# Patient Record
Sex: FEMALE | Race: WHITE | NOT HISPANIC OR LATINO | Employment: FULL TIME | URBAN - METROPOLITAN AREA
[De-identification: names, ages, dates, MRNs, and addresses within clinical notes are randomized per-mention and may not be internally consistent; named-entity substitution may affect disease eponyms.]

---

## 2023-10-09 PROBLEM — S39.012A LUMBAR STRAIN, INITIAL ENCOUNTER: Status: ACTIVE | Noted: 2023-10-09

## 2023-10-09 PROBLEM — M54.50 ACUTE LEFT-SIDED LOW BACK PAIN WITHOUT SCIATICA: Status: ACTIVE | Noted: 2023-10-09

## 2023-10-09 PROBLEM — S59.902A INJURY OF LEFT ELBOW: Status: ACTIVE | Noted: 2023-10-09

## 2023-10-09 RX ORDER — CYCLOBENZAPRINE HCL 10 MG
TABLET ORAL
COMMUNITY
Start: 2019-03-19 | End: 2023-12-06 | Stop reason: ALTCHOICE

## 2023-10-10 ENCOUNTER — OFFICE VISIT (OUTPATIENT)
Dept: OTOLARYNGOLOGY | Facility: CLINIC | Age: 28
End: 2023-10-10
Payer: COMMERCIAL

## 2023-10-10 VITALS — WEIGHT: 184.9 LBS | BODY MASS INDEX: 29.72 KG/M2 | TEMPERATURE: 97.9 F | HEIGHT: 66 IN

## 2023-10-10 DIAGNOSIS — J34.89 NASAL CAVITY MASS: Primary | ICD-10-CM

## 2023-10-10 DIAGNOSIS — J34.89 NASAL OBSTRUCTION: ICD-10-CM

## 2023-10-10 DIAGNOSIS — J32.8 OTHER CHRONIC SINUSITIS: ICD-10-CM

## 2023-10-10 DIAGNOSIS — R04.0 EPISTAXIS: ICD-10-CM

## 2023-10-10 PROCEDURE — 31231 NASAL ENDOSCOPY DX: CPT | Performed by: NURSE PRACTITIONER

## 2023-10-10 PROCEDURE — 99204 OFFICE O/P NEW MOD 45 MIN: CPT | Performed by: NURSE PRACTITIONER

## 2023-10-10 PROCEDURE — 1036F TOBACCO NON-USER: CPT | Performed by: NURSE PRACTITIONER

## 2023-10-10 RX ORDER — FLUTICASONE PROPIONATE 50 MCG
SPRAY, SUSPENSION (ML) NASAL
Qty: 16 G | Refills: 3 | Status: SHIPPED | OUTPATIENT
Start: 2023-10-10 | End: 2023-11-02

## 2023-10-10 RX ORDER — DOXYCYCLINE 100 MG/1
TABLET ORAL
Qty: 20 TABLET | Refills: 0 | Status: SHIPPED | OUTPATIENT
Start: 2023-10-10 | End: 2023-12-06 | Stop reason: ALTCHOICE

## 2023-10-10 RX ORDER — PREDNISONE 10 MG/1
TABLET ORAL
Qty: 19 TABLET | Refills: 0 | Status: SHIPPED | OUTPATIENT
Start: 2023-10-10 | End: 2023-12-06 | Stop reason: ALTCHOICE

## 2023-10-10 ASSESSMENT — PATIENT HEALTH QUESTIONNAIRE - PHQ9
1. LITTLE INTEREST OR PLEASURE IN DOING THINGS: NOT AT ALL
2. FEELING DOWN, DEPRESSED OR HOPELESS: NOT AT ALL
SUM OF ALL RESPONSES TO PHQ9 QUESTIONS 1 AND 2: 0

## 2023-10-10 NOTE — PROGRESS NOTES
Subjective   Patient ID: Akosua Bo is a 28 y.o. female who presents for a sinus infection.  HPI    Akosua Bo is a 28 y.o. old female   presenting with complaints of sinus and nose problems that began about 1 week ago.   The patient describes the sinus/nose problems as sudden onset of obstruction in the left nostril. She notes that about 3 days ago, she did have a large amount of bloody mucous dislodge from her nose and has experienced benefit since then. Her facial pressure has resolved since then. Symptoms initially began in August when she thought she had a sinus infection, with colored drainage and nasal obstruction, which improved following 2 courses of Augmentin. Patient denies facial pressure, rhinorrhea, facial pain, periorbital edema, nasal obstruction, throat clearing, hoarseness, loss of taste, and loss of smell. The patient denies epistaxis. Prior history of epistaxis in high school that resolved with cautery on the right. The patient has taken Augmentin medications to alleviate the problem. The medication Augmentin has helped. The patient has not tried nasal saline irrigations. Does not have a history of allergic rhinitis or allergies. They deny a history of aspirin sensitivity. They report 0 sinus infections per year requiring antibiotic treatment. Most recent antibiotic usage includes: Augmentin x 7 days (August), Augmentin x 5 days (in Sept).     History of prior nasal/sinus surgery or procedure: Denies    Review of Systems  Review of systems is negative for constitutional, ophthalmological, cardiac, pulmonary, renal, gastrointestinal, musculoskeletal, mental health, endocrine, or neurologic disorders (except as listed in the HPI, PMH, and Problem List).     Objective   Physical Exam  CONSTITUTIONAL: Vital signs reviewed. Patient appears well developed and well nourished.   GENERAL: this is a healthy appearing female who appears stated age. The patient is alert and appropriately  verbally conversant without hoarseness. This patient is in no apparent distress.   FACE: The face was inspected and no cutaneous masses or lesions were visualized. There was no erythema or edema noted. Facial movement was symmetric. No skin lesions were detected. There was no sinus tenderness elicited. TMJ crepitus present.   EYES: Extra-ocular muscle function was intact. No nystagmus was observed. Pupils were equal.   CRANIAL NERVES: Cranial nerves II, III, IV, and VI were noted to be intact via extra-ocular muscle movement testing. Cranial nerve VII noted to be intact and symmetric by facial movement. Cranial nerves IX and X noted to be intact by gag reflex and palatal movement. Cranial nerve XII noted to be intact by active and symmetric tongue movement.   NOSE: Examination of the nose revealed the nasal dorsum to be midline. Intranasal exam reveals the septum is deviated right. The inferior turbinates were hypertrophic. No masses, polyps, mucopus, or other lesion on anterior rhinoscopy. See below procedure note as applicable for further exam.  ORAL CAVITY: Examination of the oral cavity revealed no mass lesions nor infection. The palate was noted to be intact. The tongue exhibited normal mobility. Mucosa was moist without lesion. The lips were free of lesion. Gums were free of inflammation. Dentition: normal without obvious infection or inflammation  OROPHARYNX: The oral pharynx was free of mass lesion or mucosal abnormality. The palate was noted to be without lesion. The uvula was normal appearing. The tonsils were Normal.  EARS: Examination of the ears revealed that the auricles were normally formed with no lesions. The external auditory canals were normal. The tympanic membranes were intact.  There is no inflammation visualized.   NECK: Visualization and palpation of the neck revealed no mass lesions. No skin lesions or inflammatory processes were detected. The cervical musculature was normal to palpation.    CERVICAL LYMPHATICS: There were no palpable lymph nodes in the posterior triangle, submandibular triangle, jugulodigastric region, or central neck.  RESPIRATORY: Normal inspiration and expiration and chest wall expansion, no use of accessory muscles to breathe, no stridor.  NEUROLOGICAL: Patient is ambulatory without assist. Mentation is clear. Answering questions appropriately.     Nasal / sinus endoscopy    Date/Time: 10/10/2023 2:41 PM    Performed by: IAM Wilcox  Authorized by: IAM Wilcox    Consent:     Consent obtained:  Verbal    Consent given by:  Patient    Risks discussed:  Bleeding, infection and pain    Alternatives discussed:  No treatment, observation and delayed treatment  Procedure details:     Indications: assessment of airway and sino-nasal symptoms      Medication:  Afrin and Lai-Synephrine 2%    Instrument: flexible fiberoptic nasal endoscope      Scope location: bilateral nare    Post-procedure details:     Patient tolerance of procedure:  Tolerated well, no immediate complications  Comments:      Findings: After topical decongestion with decongestant and anesthetic spray, nasal endoscopy was performed using an endoscope. The septum was deviated right. The inferior turbinates were hypertrophic.  The middle turbinates appeared healthy, the middle meatus is free of purulence, masses, lesions or polyps on the right. On the left, there is a large crusted mass extending from the middle meatus and following the inferior turbinate posteriorly. The superior meatus and sphenoethmoid recess are clear bilaterally. The nasal passageway is patent. The nasopharynx was clear. There were no complications and the patient tolerated the procedure well.      Assessment/Plan     Akosua Bo is a 28 y.o. year old female with symptoms of nasal obstruction, nasal drainage, epistaxis and clinical findings consistent with chronic rhinosinusitis with nasal polyposis versus  left sided sinonasal mass with a large mass extending from the left middle meatus posteriorly that is crusted anteriorly.      PLAN:  1) Nasal Endoscopy today.Findings: right dev. Large mass from the left middle meatus, extending toward the nasopharynx.  Reassurance and counseling provided to patient and his condition was extensively discussed including as noted below:  2) We will start the patient on antibiotics and prednisone to complete maximal medical therapy. Rx for doxycycline x 10 days. Advised to take with food and discontinue for adverse effects.  3) Start prednisone taper. Prescription placed. Discussed potential side effects of oral steroid use were discussed at length with the patient. These risks include but are not limited to: difficulty sleeping/insomnia, increased appetite, fluid retention, mood swings, weight gain, change in blood pressure, high blood glucose, possible adrenal suppression, osteoporosis, avascular necrosis of the hip, menstrual irregularities (if applicable), and cataracts. The patient understands these risks and is willing to proceed with oral steroid therapy.   4) Patient was instructed to start steroid nasal spray.  5) A CT scan of the sinuses without contrast will be obtained post treatment.  6) The patient will follow up in 1 month after the CT scan has been completed.

## 2023-10-10 NOTE — PATIENT INSTRUCTIONS
Today you were evaluated by Caitlin Cisneros CNP.    Please follow-up in 4  weeks or sooner if needed. If you have any questions or concerns, please contact my office at [phone].     You will be started on Doxycycline, twice daily, for 10 days.    As discussed, use of doxycycline can cause a skin sensitivity reaction with the sun. Avoid sun exposure while taking this medication. Also, do not take this medication with dairy. Take a daily probiotic.    Start prednisone taper. Prescription placed. Discussed potential side effects of oral steroid use were discussed at length with the patient. These risks include but are not limited to: difficulty sleeping/insomnia, increased appetite, fluid retention, mood swings, weight gain, change in blood pressure, high blood glucose, possible adrenal suppression, osteoporosis, avascular necrosis of the hip, menstrual irregularities (if applicable), and cataracts. The patient understands these risks and is willing to proceed with oral steroid therapy.     Please contact our scheduling and  service at [phone]. They will work with you to get your test, imaging, or other appointments scheduled that might have been ordered.     A CT scan was ordered today. This is a non-contrast CT scan. Please call the  to create an appointment for this scan. However, we do not want you to obtain the imaging until you have completed the full course of the antibiotic medication prescribed. We would also like for you to obtain this CT scan at a [facility] facility.     Initial (On Arrival)

## 2023-10-25 ENCOUNTER — HOSPITAL ENCOUNTER (OUTPATIENT)
Dept: RADIOLOGY | Facility: CLINIC | Age: 28
Discharge: HOME | End: 2023-10-25
Payer: COMMERCIAL

## 2023-10-25 DIAGNOSIS — J32.8 OTHER CHRONIC SINUSITIS: ICD-10-CM

## 2023-10-25 PROCEDURE — 70486 CT MAXILLOFACIAL W/O DYE: CPT

## 2023-10-25 PROCEDURE — 70486 CT MAXILLOFACIAL W/O DYE: CPT | Performed by: RADIOLOGY

## 2023-11-01 ENCOUNTER — TELEMEDICINE (OUTPATIENT)
Dept: OTOLARYNGOLOGY | Facility: CLINIC | Age: 28
End: 2023-11-01
Payer: COMMERCIAL

## 2023-11-01 DIAGNOSIS — J34.89 NASAL OBSTRUCTION: ICD-10-CM

## 2023-11-01 DIAGNOSIS — J34.89 NASAL CAVITY MASS: Primary | ICD-10-CM

## 2023-11-01 PROCEDURE — 99213 OFFICE O/P EST LOW 20 MIN: CPT | Performed by: NURSE PRACTITIONER

## 2023-11-01 ASSESSMENT — PATIENT HEALTH QUESTIONNAIRE - PHQ9
SUM OF ALL RESPONSES TO PHQ9 QUESTIONS 1 AND 2: 0
2. FEELING DOWN, DEPRESSED OR HOPELESS: NOT AT ALL
1. LITTLE INTEREST OR PLEASURE IN DOING THINGS: NOT AT ALL

## 2023-11-01 NOTE — PROGRESS NOTES
Subjective   Patient ID: Akosua Bo is a 28 y.o. female who presents for No chief complaint on file..  HPI  Akosua Bo is a 28 y.o. year old female with symptoms of nasal obstruction, nasal drainage, epistaxis and clinical findings consistent with chronic rhinosinusitis with nasal polyposis versus left sided sinonasal mass with a large mass extending from the left middle meatus posteriorly that is crusted anteriorly.   11/1/23- TELEHEALTH- Patient presents for follow-up. She continues with left sided nasal obstruction. She has posterior nasal drainage (clear, intermittently yellow to green). She is using flonase.     I personally reviewed the patients CT scan images and results. I discussed the results personally with the patient. The following findings were discussed: 10/25/23: CT Sinus: Right nasal septal deviation. Complete opacification of the left maxillary sinus. Otherwise, paranasal sinuses are clear.     Review of Systems  Review of systems is negative for constitutional, ophthalmological, cardiac, pulmonary, renal, gastrointestinal, musculoskeletal, mental health, endocrine, or neurologic disorders (except as listed in the HPI, PMH, and Problem List).     Objective   Physical Exam  CONSTITUTIONAL: Patient appears well developed and well nourished.   GENERAL: this is a healthy appearing female who appears stated age. The patient is alert and appropriately verbally conversant without hoarseness. This patient is in no apparent distress.   FACE: The face was inspected and no cutaneous masses or lesions were visualized. There was no erythema or edema noted. Facial movement was symmetric. No skin lesions were detected.   EYES: Extra-ocular muscle function was intact. No nystagmus was observed. Pupils were equal.   NOSE: Examination of the nose revealed the nasal dorsum to be midline. RESPIRATORY: Normal inspiration and expiration and chest wall expansion, no use of accessory muscles to  breathe, no stridor.  NEUROLOGICAL: Patient is ambulatory without assist. Mentation is clear. Answering questions appropriately.     Assessment/Plan     Akosua Bo is a 28 y.o. year old female with symptoms of nasal obstruction, nasal drainage, epistaxis and clinical findings consistent with chronic rhinosinusitis with nasal polyposis versus left sided sinonasal mass with a large mass extending from the left middle meatus posteriorly that is crusted anteriorly.  11/1/23- TELEHEALTH- Persisting left maxillary opacification despite maximum medical therapy. Surgical referral to Dr. Jarad Casarez.         PLAN:  1) Telehealth visit today.  Reassurance and counseling provided to patient and his condition was extensively discussed including as noted below:  2) I personally reviewed the patients CT scan images and results. I discussed the results personally with the patient. The following findings were discussed: 10/25/23: CT Sinus: Right nasal septal deviation. Complete opacification of the left maxillary sinus. Otherwise, paranasal sinuses are clear.   3)  Patient was instructed to continue steroid nasal spray.  4) The patient and myself had an extensive discussion regarding next steps for further management. We discussed endoscopic sinus surgery at length. We discussed implications for treatment. Patient referred for surgical management with Dr. Jarad Casarez. His office was contacted directly for scheduling.     9 minutes was spent on this patient's visit. More than 50% of that time was spent in counseling regarding the possible etiologies, test results, treatment options and coordinating care.

## 2023-11-02 RX ORDER — FLUTICASONE PROPIONATE 50 MCG
SPRAY, SUSPENSION (ML) NASAL
Qty: 48 ML | Refills: 2 | Status: SHIPPED | OUTPATIENT
Start: 2023-11-02 | End: 2024-01-05 | Stop reason: HOSPADM

## 2023-12-04 NOTE — PROGRESS NOTES
[facility]   12/6/23 - Akosua Bo is a 28 y.o. female referred by Caitlin Cisneros for a surgical consult. The patient reports concerns of sinus and nose problems that started 5 months ago (since August 2023). The patient describes her main symptoms as #1 ear pain/pressure, #2 nasal drainage, #3 nasal obstruction, and #4 slight reduction in sense smell/taste on the left side.    Per Caitlin Cisneros's initial note 10/10/2023:  Akosua Bo is a 28 y.o. old female. The patient describes the sinus/nose problems as sudden onset of obstruction in the left nostril. She notes that about 3 days ago, she did have a large amount of bloody mucous dislodge from her nose and has experienced benefit since then. Her facial pressure has resolved since then. Symptoms initially began in August when she thought she had a sinus infection, with colored drainage and nasal obstruction, which improved following 2 courses of Augmentin. Patient denies facial pressure, rhinorrhea, facial pain, periorbital edema, nasal obstruction, throat clearing, hoarseness, loss of taste, and loss of smell. The patient denies epistaxis. Prior history of epistaxis in high school that resolved with cautery on the right. The patient has taken Augmentin medications to alleviate the problem. The medication Augmentin has helped. The patient has not tried nasal saline irrigations. Does not have a history of allergic rhinitis or allergies. They deny a history of aspirin sensitivity. They report 0 sinus infections per year requiring antibiotic treatment. Most recent antibiotic usage includes: Augmentin x 7 days (August), Augmentin x 5 days (in Sept).     No history of prior nasal/sinus surgery or procedure. No prior history of nasal polyps.    No past medical history on file.    No past surgical history on file.    Social History     Socioeconomic History    Marital status: Single     Spouse name: Not on file    Number of children: Not on file    Years  of education: Not on file    Highest education level: Not on file   Occupational History    Not on file   Tobacco Use    Smoking status: Never    Smokeless tobacco: Never   Substance and Sexual Activity    Alcohol use: Not on file    Drug use: Not on file    Sexual activity: Not on file   Other Topics Concern    Not on file   Social History Narrative    Not on file     Social Determinants of Health     Financial Resource Strain: Not on file   Food Insecurity: Not on file   Transportation Needs: Not on file   Physical Activity: Not on file   Stress: Not on file   Social Connections: Not on file   Intimate Partner Violence: Not on file   Housing Stability: Not on file     No family history on file.    No Known Allergies    Medication Documentation Review Audit       Reviewed by Michael Holbrook PCNA (Patient Care Technician) on 12/06/23 at 1327      Medication Order Taking? Sig Documenting Provider Last Dose Status   cyclobenzaprine (Flexeril) 10 mg tablet [id number]  1/2 - 1 tablet 3x a day as needed for pain or spasm, sedating Historical Provider, MD  Active    Patient not taking:   Discontinued 12/06/23 1327     Discontinued 12/06/23 1327   fluticasone (Flonase) 50 mcg/actuation nasal spray [id number] Yes SHAKE GENTLY. BEFORE FIRST USE, PRIME PUMP. SPRAY TOWARD OUTSIDE WALL OF NOSE. Caitlin Cisneros, APRN-CNP Taking Active    Patient not taking:   Discontinued 12/06/23 1327                    Review of Systems   Negative for constitutional, eyes, cardiac, pulmonary, hepatic, renal, digestive, hematologic, epileptic, syncopal, musculoskeletal, mental health, integumentary, hypertensive, lipid, arthritic, diabetic, thyroid or neurologic disorders (except as listed in the HPI, PMH and Problem List).       Assessment   Akosua Bo is a 28 y.o. female with symptoms of nasal obstruction, nasal drainage, epistaxis and clinical findings consistent with chronic rhinosinusitis with nasal polyposis versus left sided  sinonasal mass with a large mass extending from the left middle meatus posteriorly that is crusted anteriorly.  11/1/23 -TELEHEALTH-CM- Persisting left maxillary opacification despite maximum medical therapy. Surgical referral to Dr. Jarad Casarez.   12/6/23 - Patient reports current symptoms including The patient describes her main symptoms as ear pain/pressure, nasal obstruction/drainage, and slight reduction in sense of smell/taste on the left side. She has failed maximal medical therapy including greater than 22 days of antibiotics. She is a surgical candidate as noted below.    Plan   Reassurance and counseling was provided to the patient, and her condition was extensively discussed, including as noted below:    I personally reviewed the patient's CT scan images and results. I discussed the results personally with the patient. The following findings were discussed: 10/25/23: CT Sinus: Shows complete opacification of the left maxillary sinus. There is a right septal deviation and bilateral inferior turbinate hypertrophy. There appears to be a component of the mass that extends into the nasal cavity. Otherwise, paranasal sinuses are clear.    Patient is a good candidate for endoscopic sinus surgery. Patient has failed maximal medical therapy. Akosua Bo and I discussed her symptoms and clinical findings noted above in detail. We discussed options including observation, continued medical therapy, or consideration of surgical intervention. Endoscopic sinus surgery itself was discussed at length. The risks, benefits, complications, and alternatives were explained in detail including but not limited to persistence of symptoms, anesthesia, pain, changes in or loss of smell, nasal obstruction, septal perforation, bleeding, infection, need for nasal packing, double vision, vision loss, CSF leak requiring other procedures to repair, numbness of teeth/and or face, need for revision surgery, injury to surrounding  tissue, and unexpected risks.  Patient is a candidate for Left ESS (max), left inferior turbinate outfracture, IGS  The patient expressed understanding of these risks and provided informed consent. An information sheet via the medical record was provided to the patient, which included the rationale for surgery, risks, and expected postoperative care. Patient will be contacted by surgical schedulers.    Patient may continue the steroid nasal spray for relief of symptoms until surgery.  Patient will follow up in clinic after surgery.    Referring Provider: Caitlin Cisneros CNP  I will provide a report to the referring provider via the electronic medical record or US mail.    Jarad Casarez MD [facility]  Division of Rhinology, Sinus, and Skull Base Surgery       Exam   CONSTITUTIONAL: Vital signs reviewed. Patient appears well developed and well nourished.   GENERAL: this is a healthy appearing female who appears stated age. The patient is alert and appropriately verbally conversant without hoarseness. This patient is in no apparent distress.   FACE: The face was inspected and no cutaneous masses or lesions were visualized. There was no erythema or edema noted. Facial movement was symmetric. No skin lesions were detected. There was no sinus tenderness elicited. TMJ crepitus present.   EYES: Extra-ocular muscle function was intact. No nystagmus was observed. Pupils were equal.   CRANIAL NERVES: Cranial nerves II, III, IV, and VI were noted to be intact via extra-ocular muscle movement testing. Cranial nerve VII noted to be intact and symmetric by facial movement. Cranial nerves IX and X noted to be intact by gag reflex and palatal movement. Cranial nerve XII noted to be intact by active and symmetric tongue movement.   ORAL CAVITY: Examination of the oral cavity revealed no mass lesions nor infection. The palate was noted to be intact. The tongue exhibited normal mobility. Mucosa was moist without lesion. The lips were free  of lesion. Gums were free of inflammation. Dentition: normal without obvious infection or inflammation  OROPHARYNX: The oral pharynx was free of mass lesion or mucosal abnormality. The palate was noted to be without lesion. The uvula was normal appearing. The tonsils were Normal.  EARS: Examination of the ears revealed that the auricles were normally formed with no lesions. The external auditory canals were normal. The tympanic membranes were intact.  There is no inflammation visualized.   NECK: Visualization and palpation of the neck revealed no mass lesions. No skin lesions or inflammatory processes were detected. The cervical musculature was normal to palpation.   CERVICAL LYMPHATICS: There were no palpable lymph nodes in the posterior triangle, submandibular triangle, jugulodigastric region, or central neck.  RESPIRATORY: Normal inspiration and expiration and chest wall expansion, no use of accessory muscles to breathe, no stridor.  NEUROLOGICAL: Patient is ambulatory without assist. Mentation is clear. Answering questions appropriately.     NOSE: Examination of the nose revealed the nasal dorsum to be midline. Intranasal exam reveals the septum is deviated to the right. The inferior turbinates were hypertrophic. No masses, polyps, mucopus, or other lesion on anterior rhinoscopy. A brief view of the posterior nasal cavity revealed watery mass, consistent with a polyp.       Scribe Attestation  By signing my name below, I, Melvin Garces, attest that this documentation has been prepared under the direction and in the presence of Jarad Casarez MD. All medical record entries made by the Scribe were at my direction or personally dictated by me. I have reviewed the chart and agree that the record accurately reflects my personal performance of the history, physical exam, discussion and plan.

## 2023-12-04 NOTE — H&P (VIEW-ONLY)
[facility]   12/6/23 - Akosua Bo is a 28 y.o. female referred by Caitlin Cisneros for a surgical consult. The patient reports concerns of sinus and nose problems that started 5 months ago (since August 2023). The patient describes her main symptoms as #1 ear pain/pressure, #2 nasal drainage, #3 nasal obstruction, and #4 slight reduction in sense smell/taste on the left side.    Per Caitlin Cisneros's initial note 10/10/2023:  Akosua Bo is a 28 y.o. old female. The patient describes the sinus/nose problems as sudden onset of obstruction in the left nostril. She notes that about 3 days ago, she did have a large amount of bloody mucous dislodge from her nose and has experienced benefit since then. Her facial pressure has resolved since then. Symptoms initially began in August when she thought she had a sinus infection, with colored drainage and nasal obstruction, which improved following 2 courses of Augmentin. Patient denies facial pressure, rhinorrhea, facial pain, periorbital edema, nasal obstruction, throat clearing, hoarseness, loss of taste, and loss of smell. The patient denies epistaxis. Prior history of epistaxis in high school that resolved with cautery on the right. The patient has taken Augmentin medications to alleviate the problem. The medication Augmentin has helped. The patient has not tried nasal saline irrigations. Does not have a history of allergic rhinitis or allergies. They deny a history of aspirin sensitivity. They report 0 sinus infections per year requiring antibiotic treatment. Most recent antibiotic usage includes: Augmentin x 7 days (August), Augmentin x 5 days (in Sept).     No history of prior nasal/sinus surgery or procedure. No prior history of nasal polyps.    No past medical history on file.    No past surgical history on file.    Social History     Socioeconomic History    Marital status: Single     Spouse name: Not on file    Number of children: Not on file    Years  of education: Not on file    Highest education level: Not on file   Occupational History    Not on file   Tobacco Use    Smoking status: Never    Smokeless tobacco: Never   Substance and Sexual Activity    Alcohol use: Not on file    Drug use: Not on file    Sexual activity: Not on file   Other Topics Concern    Not on file   Social History Narrative    Not on file     Social Determinants of Health     Financial Resource Strain: Not on file   Food Insecurity: Not on file   Transportation Needs: Not on file   Physical Activity: Not on file   Stress: Not on file   Social Connections: Not on file   Intimate Partner Violence: Not on file   Housing Stability: Not on file     No family history on file.    No Known Allergies    Medication Documentation Review Audit       Reviewed by Michael Holbrook PCNA (Patient Care Technician) on 12/06/23 at 1327      Medication Order Taking? Sig Documenting Provider Last Dose Status   cyclobenzaprine (Flexeril) 10 mg tablet [id number]  1/2 - 1 tablet 3x a day as needed for pain or spasm, sedating Historical Provider, MD  Active    Patient not taking:   Discontinued 12/06/23 1327     Discontinued 12/06/23 1327   fluticasone (Flonase) 50 mcg/actuation nasal spray [id number] Yes SHAKE GENTLY. BEFORE FIRST USE, PRIME PUMP. SPRAY TOWARD OUTSIDE WALL OF NOSE. Caitlin Cisneros, APRN-CNP Taking Active    Patient not taking:   Discontinued 12/06/23 1327                    Review of Systems   Negative for constitutional, eyes, cardiac, pulmonary, hepatic, renal, digestive, hematologic, epileptic, syncopal, musculoskeletal, mental health, integumentary, hypertensive, lipid, arthritic, diabetic, thyroid or neurologic disorders (except as listed in the HPI, PMH and Problem List).       Assessment   Akosua Bo is a 28 y.o. female with symptoms of nasal obstruction, nasal drainage, epistaxis and clinical findings consistent with chronic rhinosinusitis with nasal polyposis versus left sided  sinonasal mass with a large mass extending from the left middle meatus posteriorly that is crusted anteriorly.  11/1/23 -[facility]- Persisting left maxillary opacification despite maximum medical therapy. Surgical referral to Dr. Jarad Casarez.   12/6/23 - Patient reports current symptoms including The patient describes her main symptoms as ear pain/pressure, nasal obstruction/drainage, and slight reduction in sense of smell/taste on the left side. She has failed maximal medical therapy including greater than 22 days of antibiotics. She is a surgical candidate as noted below.    Plan   Reassurance and counseling was provided to the patient, and her condition was extensively discussed, including as noted below:    I personally reviewed the patient's CT scan images and results. I discussed the results personally with the patient. The following findings were discussed: 10/25/23: CT Sinus: Shows complete opacification of the left maxillary sinus. There is a right septal deviation and bilateral inferior turbinate hypertrophy. There appears to be a component of the mass that extends into the nasal cavity. Otherwise, paranasal sinuses are clear.    Patient is a good candidate for endoscopic sinus surgery. Patient has failed maximal medical therapy. Akosua Bo and I discussed her symptoms and clinical findings noted above in detail. We discussed options including observation, continued medical therapy, or consideration of surgical intervention. Endoscopic sinus surgery itself was discussed at length. The risks, benefits, complications, and alternatives were explained in detail including but not limited to persistence of symptoms, anesthesia, pain, changes in or loss of smell, nasal obstruction, septal perforation, bleeding, infection, need for nasal packing, double vision, vision loss, CSF leak requiring other procedures to repair, numbness of teeth/and or face, need for revision surgery, injury to surrounding  tissue, and unexpected risks.  Patient is a candidate for Left ESS (max), left inferior turbinate outfracture, IGS  The patient expressed understanding of these risks and provided informed consent. An information sheet via the medical record was provided to the patient, which included the rationale for surgery, risks, and expected postoperative care. Patient will be contacted by surgical schedulers.    Patient may continue the steroid nasal spray for relief of symptoms until surgery.  Patient will follow up in clinic after surgery.    Referring Provider: Caitlin Cisneros CNP  I will provide a report to the referring provider via the electronic medical record or US mail.    Jarad Casarez MD [facility]  Division of Rhinology, Sinus, and Skull Base Surgery       Exam   CONSTITUTIONAL: Vital signs reviewed. Patient appears well developed and well nourished.   GENERAL: this is a healthy appearing female who appears stated age. The patient is alert and appropriately verbally conversant without hoarseness. This patient is in no apparent distress.   FACE: The face was inspected and no cutaneous masses or lesions were visualized. There was no erythema or edema noted. Facial movement was symmetric. No skin lesions were detected. There was no sinus tenderness elicited. TMJ crepitus present.   EYES: Extra-ocular muscle function was intact. No nystagmus was observed. Pupils were equal.   CRANIAL NERVES: Cranial nerves II, III, IV, and VI were noted to be intact via extra-ocular muscle movement testing. Cranial nerve VII noted to be intact and symmetric by facial movement. Cranial nerves IX and X noted to be intact by gag reflex and palatal movement. Cranial nerve XII noted to be intact by active and symmetric tongue movement.   ORAL CAVITY: Examination of the oral cavity revealed no mass lesions nor infection. The palate was noted to be intact. The tongue exhibited normal mobility. Mucosa was moist without lesion. The lips were free  of lesion. Gums were free of inflammation. Dentition: normal without obvious infection or inflammation  OROPHARYNX: The oral pharynx was free of mass lesion or mucosal abnormality. The palate was noted to be without lesion. The uvula was normal appearing. The tonsils were Normal.  EARS: Examination of the ears revealed that the auricles were normally formed with no lesions. The external auditory canals were normal. The tympanic membranes were intact.  There is no inflammation visualized.   NECK: Visualization and palpation of the neck revealed no mass lesions. No skin lesions or inflammatory processes were detected. The cervical musculature was normal to palpation.   CERVICAL LYMPHATICS: There were no palpable lymph nodes in the posterior triangle, submandibular triangle, jugulodigastric region, or central neck.  RESPIRATORY: Normal inspiration and expiration and chest wall expansion, no use of accessory muscles to breathe, no stridor.  NEUROLOGICAL: Patient is ambulatory without assist. Mentation is clear. Answering questions appropriately.     NOSE: Examination of the nose revealed the nasal dorsum to be midline. Intranasal exam reveals the septum is deviated to the right. The inferior turbinates were hypertrophic. No masses, polyps, mucopus, or other lesion on anterior rhinoscopy. A brief view of the posterior nasal cavity revealed watery mass, consistent with a polyp.       Scribe Attestation  By signing my name below, I, Melvin Garces, attest that this documentation has been prepared under the direction and in the presence of Jarad Casarez MD. All medical record entries made by the Scribe were at my direction or personally dictated by me. I have reviewed the chart and agree that the record accurately reflects my personal performance of the history, physical exam, discussion and plan.

## 2023-12-04 NOTE — PATIENT INSTRUCTIONS
You are a good candidate for endoscopic sinus surgery, and inferior turbinate reductions. After your sinus surgery, you will need to do frequent saline irrigations. This will extremely important so that your sinuses stay open after surgery. Please review the instructions below to prepare for surgery.    You may continue using Flonase nasal spray for relief of symptoms until surgery. Do 1 spray in each nostril twice daily. Flonase works best when used daily. Be sure to angle the spray slightly outwards toward the corner of the eye on the same side nostril.    PATIENT INFORMATION/INSTRUCTIONS PRIOR TO SINUS SURGERY: *Please Read Carefully*    Dr. Casarez discussed the rationale for endoscopic sinus surgery, along with the benefits, risks, potential complications, and side effects of the procedure with you today. If you have any questions about these subjects, please feel free to call our office at [phone].    You can expect after surgery to have increased symptoms and congestion for at least 1 week and sometimes upward of a month. Everyone's body reacts differently. You will have at least 2 visits with Dr. Casarez for cleaning/debridement of the sinuses in the office after surgery, approximately 1 week and 1 month after surgery. This is necessary to ensure the sinuses heal well.    As described, your sinus surgery is only half the leonard. Post operatively, it is VITAL that you continue the nasal irrigations and other medications directed by Dr. Casarez. You will be given an instructional sheet post operatively that describes the expected disease course including nasal care. Dr. Casarez will want you to start irrigations of the nose with saline post operatively. This is also important to keep the nose and sinuses open.    STOP TAKING THESE MEDICATIONS prior to surgery:  Aspirin - 10 to 14 days before surgery  Plavix/clopidogrel - 10 days before surgery  NSAIDs - 7 days before surgery (NSAIDs include painkillers like  Motrin, Aleve, Naprosyn, Mobic, diclofenac, and ibuprofen)  Vitamin E - 10 to 14 days before surgery  Multivitamins with Vitamin E - 10 to 14 days before surgery  Herbal Medications/Diet Pills - 10 to 14 days before surgery    5.   Avoid NSAIDs for PAIN RELIEF. If needed, you could take Tylenol on the day of surgery with sips of water. You may also use opiates prescribed by your physician which includes medications like Percocet / Vicodin / MS Contin / Darvocet / Ultram.    6.   BLOOD THINNERS including Coumadin, Plavix, and Ticlid are to be stopped as directed by surgeon/cardiologist or as listed below.    7.   FOR PATIENTS WITH ASTHMA/COPD:  Use your inhalers as prescribed/as needed on the day of surgery. Bring your inhalers with you to the hospital. If you have Obstructive Sleep Apnea and are on a CPAP/BiPAP machine, please bring it with you to the hospital.    8.   On the day of surgery, DO NOT wear jewelry, body piercings, makeup, nail polish, hairpins, or contacts. Leave all valuables and money with family members. If you have dentures, please leave these with family members.    9.   IF you are undergoing an OUTPATIENT procedure (Ambulatory Surgery - going home on the same day), you must have someone drive you home and stay with you for 24 hours after surgery. You cannot stay alone in a hotel room after outpatient surgery. Also, a  or  cannot be made a responsible caregiver. Your surgery may be cancelled if you do not have someone take care of you for 24 hours.    10.  You will be given a time to arrive the business day before surgery. If you do not hear about a time by 4:30 pm the business day before surgery, please call [phone] and ask for a time.    AFTER SURGERY, please observe the following precautions:  Refrain from blowing your nose for at least 2 weeks from the date of your surgery. Please do not stifle any sneezes. If you must sneeze, then try to sneeze through an open mouth.  Please do not stick anything in your nose other than any medicine or irrigations we recommend. Please do not insert a Q-tip or finger in the nose after surgery as this can cause further trauma. Please refrain from any activities that will increase your heart rate or blood pressure for at least 2 weeks from the date of your surgery. Try to keep your head above your heart as much as possible. Please refrain from lifting objects greater than 10 lbs for at least 2 weeks from the date of your surgery.    Scribe Attestation  By signing my name below, I, Melvin Garces, attest that this documentation has been prepared under the direction and in the presence of Jarad Casarez MD. All medical record entries made by the Scribe were at my direction or personally dictated by me. I have reviewed the chart and agree that the record accurately reflects my personal performance of the history, physical exam, discussion and plan.

## 2023-12-06 ENCOUNTER — OFFICE VISIT (OUTPATIENT)
Dept: OTOLARYNGOLOGY | Facility: CLINIC | Age: 28
End: 2023-12-06
Payer: COMMERCIAL

## 2023-12-06 VITALS — WEIGHT: 186.4 LBS | BODY MASS INDEX: 30.09 KG/M2 | TEMPERATURE: 97.7 F

## 2023-12-06 DIAGNOSIS — J34.2 DEVIATED NASAL SEPTUM: ICD-10-CM

## 2023-12-06 DIAGNOSIS — J34.89 LESION OR MASS OF PARANASAL SINUSES: ICD-10-CM

## 2023-12-06 DIAGNOSIS — J34.89 NASAL DRAINAGE: ICD-10-CM

## 2023-12-06 DIAGNOSIS — J33.0 ANTROCHOANAL POLYP: ICD-10-CM

## 2023-12-06 DIAGNOSIS — J33.0 POLYP OF NASAL CAVITY: ICD-10-CM

## 2023-12-06 DIAGNOSIS — J34.3 HYPERTROPHY OF INFERIOR NASAL TURBINATE: ICD-10-CM

## 2023-12-06 DIAGNOSIS — J34.89 NASAL OBSTRUCTION: ICD-10-CM

## 2023-12-06 DIAGNOSIS — J32.0 CHRONIC MAXILLARY SINUSITIS: Primary | ICD-10-CM

## 2023-12-06 DIAGNOSIS — J32.0 CHRONIC MAXILLARY SINUSITIS: ICD-10-CM

## 2023-12-06 PROCEDURE — 99213 OFFICE O/P EST LOW 20 MIN: CPT | Performed by: OTOLARYNGOLOGY

## 2023-12-06 PROCEDURE — 1036F TOBACCO NON-USER: CPT | Performed by: OTOLARYNGOLOGY

## 2023-12-06 ASSESSMENT — PATIENT HEALTH QUESTIONNAIRE - PHQ9: 2. FEELING DOWN, DEPRESSED OR HOPELESS: NOT AT ALL

## 2023-12-06 NOTE — LETTER
December 6, 2023     IAM Wilcox  [address] [facility] [address]    Patient: Akosua Bo   YOB: 1995   Date of Visit: 12/6/2023       Dear Esteemed Colleague IAM Wilcox:    Thank you for referring Akosua Bo to me for evaluation. Below are my notes for this consultation.  If you have questions, please do not hesitate to call me. I look forward to following your patient along with you.       Sincerely,     Jarad Casarez MD      CC: No Recipients  ______________________________________________________________________________________    HPI  12/6/23 - Akosua Bo is a 28 y.o. female referred by Caitlin Cisneros for a surgical consult. The patient reports concerns of sinus and nose problems that started 5 months ago (since August 2023). The patient describes her main symptoms as #1 ear pain/pressure, #2 nasal drainage, #3 nasal obstruction, and #4 slight reduction in sense smell/taste on the left side.    Per Caitlin Cisneros's initial note 10/10/2023:  Akosua Bo is a 28 y.o. old female. The patient describes the sinus/nose problems as sudden onset of obstruction in the left nostril. She notes that about 3 days ago, she did have a large amount of bloody mucous dislodge from her nose and has experienced benefit since then. Her facial pressure has resolved since then. Symptoms initially began in August when she thought she had a sinus infection, with colored drainage and nasal obstruction, which improved following 2 courses of Augmentin. Patient denies facial pressure, rhinorrhea, facial pain, periorbital edema, nasal obstruction, throat clearing, hoarseness, loss of taste, and loss of smell. The patient denies epistaxis. Prior history of epistaxis in high school that resolved with cautery on the right. The patient has taken Augmentin medications to alleviate the problem. The medication Augmentin has helped. The patient has not  tried nasal saline irrigations. Does not have a history of allergic rhinitis or allergies. They deny a history of aspirin sensitivity. They report 0 sinus infections per year requiring antibiotic treatment. Most recent antibiotic usage includes: Augmentin x 7 days (August), Augmentin x 5 days (in Sept).     No history of prior nasal/sinus surgery or procedure. No prior history of nasal polyps.    No past medical history on file.    No past surgical history on file.    Social History     Socioeconomic History   • Marital status: Single     Spouse name: Not on file   • Number of children: Not on file   • Years of education: Not on file   • Highest education level: Not on file   Occupational History   • Not on file   Tobacco Use   • Smoking status: Never   • Smokeless tobacco: Never   Substance and Sexual Activity   • Alcohol use: Not on file   • Drug use: Not on file   • Sexual activity: Not on file   Other Topics Concern   • Not on file   Social History Narrative   • Not on file     Social Determinants of Health     Financial Resource Strain: Not on file   Food Insecurity: Not on file   Transportation Needs: Not on file   Physical Activity: Not on file   Stress: Not on file   Social Connections: Not on file   Intimate Partner Violence: Not on file   Housing Stability: Not on file     No family history on file.    No Known Allergies    Medication Documentation Review Audit       Reviewed by Michael Holbrook, PCNA (Patient Care Technician) on 12/06/23 at 1327      Medication Order Taking? Sig Documenting Provider Last Dose Status   cyclobenzaprine (Flexeril) 10 mg tablet [id number]  1/2 - 1 tablet 3x a day as needed for pain or spasm, sedating Historical Provider, MD  Active    Patient not taking:   Discontinued 12/06/23 1327     Discontinued 12/06/23 1327   fluticasone (Flonase) 50 mcg/actuation nasal spray [id number] Yes SHAKE GENTLY. BEFORE FIRST USE, PRIME PUMP. SPRAY TOWARD OUTSIDE WALL OF NOSE. Caitlin Cisneros,  APRN-CNP Taking Active    Patient not taking:   Discontinued 12/06/23 5377                    Review of Systems  Negative for constitutional, eyes, cardiac, pulmonary, hepatic, renal, digestive, hematologic, epileptic, syncopal, musculoskeletal, mental health, integumentary, hypertensive, lipid, arthritic, diabetic, thyroid or neurologic disorders (except as listed in the HPI, PMH and Problem List).       Assessment  Akosua Bo is a 28 y.o. female with symptoms of nasal obstruction, nasal drainage, epistaxis and clinical findings consistent with chronic rhinosinusitis with nasal polyposis versus left sided sinonasal mass with a large mass extending from the left middle meatus posteriorly that is crusted anteriorly.  11/1/23 -TELEHEALTH-CM- Persisting left maxillary opacification despite maximum medical therapy. Surgical referral to Dr. Jarad Casarez.   12/6/23 - Patient reports current symptoms including The patient describes her main symptoms as ear pain/pressure, nasal obstruction/drainage, and slight reduction in sense of smell/taste on the left side. She has failed maximal medical therapy including greater than 22 days of antibiotics. She is a surgical candidate as noted below.    Plan  Reassurance and counseling was provided to the patient, and her condition was extensively discussed, including as noted below:    I personally reviewed the patient's CT scan images and results. I discussed the results personally with the patient. The following findings were discussed: 10/25/23: CT Sinus: Shows complete opacification of the left maxillary sinus. There is a right septal deviation and bilateral inferior turbinate hypertrophy. There appears to be a component of the mass that extends into the nasal cavity. Otherwise, paranasal sinuses are clear.    Patient is a good candidate for endoscopic sinus surgery. Patient has failed maximal medical therapy. Akosua Bo and I discussed her symptoms and  clinical findings noted above in detail. We discussed options including observation, continued medical therapy, or consideration of surgical intervention. Endoscopic sinus surgery itself was discussed at length. The risks, benefits, complications, and alternatives were explained in detail including but not limited to persistence of symptoms, anesthesia, pain, changes in or loss of smell, nasal obstruction, septal perforation, bleeding, infection, need for nasal packing, double vision, vision loss, CSF leak requiring other procedures to repair, numbness of teeth/and or face, need for revision surgery, injury to surrounding tissue, and unexpected risks.  Patient is a candidate for Left ESS (max), left inferior turbinate outfracture, IGS  The patient expressed understanding of these risks and provided informed consent. An information sheet via the medical record was provided to the patient, which included the rationale for surgery, risks, and expected postoperative care. Patient will be contacted by surgical schedulers.    Patient may continue the steroid nasal spray for relief of symptoms until surgery.  Patient will follow up in clinic after surgery.    Referring Provider: Caitlin Cisneros CNP  I will provide a report to the referring provider via the electronic medical record or US mail.    Jarad Casarez MD [facility]  Division of Rhinology, Sinus, and Skull Base Surgery       Exam  CONSTITUTIONAL: Vital signs reviewed. Patient appears well developed and well nourished.   GENERAL: this is a healthy appearing female who appears stated age. The patient is alert and appropriately verbally conversant without hoarseness. This patient is in no apparent distress.   FACE: The face was inspected and no cutaneous masses or lesions were visualized. There was no erythema or edema noted. Facial movement was symmetric. No skin lesions were detected. There was no sinus tenderness elicited. TMJ crepitus present.   EYES: Extra-ocular  muscle function was intact. No nystagmus was observed. Pupils were equal.   CRANIAL NERVES: Cranial nerves II, III, IV, and VI were noted to be intact via extra-ocular muscle movement testing. Cranial nerve VII noted to be intact and symmetric by facial movement. Cranial nerves IX and X noted to be intact by gag reflex and palatal movement. Cranial nerve XII noted to be intact by active and symmetric tongue movement.   ORAL CAVITY: Examination of the oral cavity revealed no mass lesions nor infection. The palate was noted to be intact. The tongue exhibited normal mobility. Mucosa was moist without lesion. The lips were free of lesion. Gums were free of inflammation. Dentition: normal without obvious infection or inflammation  OROPHARYNX: The oral pharynx was free of mass lesion or mucosal abnormality. The palate was noted to be without lesion. The uvula was normal appearing. The tonsils were Normal.  EARS: Examination of the ears revealed that the auricles were normally formed with no lesions. The external auditory canals were normal. The tympanic membranes were intact.  There is no inflammation visualized.   NECK: Visualization and palpation of the neck revealed no mass lesions. No skin lesions or inflammatory processes were detected. The cervical musculature was normal to palpation.   CERVICAL LYMPHATICS: There were no palpable lymph nodes in the posterior triangle, submandibular triangle, jugulodigastric region, or central neck.  RESPIRATORY: Normal inspiration and expiration and chest wall expansion, no use of accessory muscles to breathe, no stridor.  NEUROLOGICAL: Patient is ambulatory without assist. Mentation is clear. Answering questions appropriately.     NOSE: Examination of the nose revealed the nasal dorsum to be midline. Intranasal exam reveals the septum is deviated to the right. The inferior turbinates were hypertrophic. No masses, polyps, mucopus, or other lesion on anterior rhinoscopy. A brief view  of the posterior nasal cavity revealed watery mass, consistent with a polyp.       Scribe Attestation  By signing my name below, I, Melvin Garces, attest that this documentation has been prepared under the direction and in the presence of Jarad Casarez MD. All medical record entries made by the Scribe were at my direction or personally dictated by me. I have reviewed the chart and agree that the record accurately reflects my personal performance of the history, physical exam, discussion and plan.

## 2023-12-14 RX ORDER — BISMUTH SUBSALICYLATE 262 MG
1 TABLET,CHEWABLE ORAL DAILY
COMMUNITY
End: 2024-01-05 | Stop reason: HOSPADM

## 2023-12-14 NOTE — CPM/PAT H&P
CPM/PAT Evaluation       Name: Akosua Bo (Akosua Bo)  /Age: 1995/28 y.o.     TELEMEDICINE ENCOUNTER  Patient was contacted by telephone for preadmission testing perioperative risk assessment prior to surgery.    CHIEF COMPLAINT  Chronic sinusitis, nasal obstructive breathing    HPI  Patient is a 28-year-old female complaining of sinus symptoms that began in 2023.  She reports having nasal drainage, ear pain/pressure, nasal obstruction.  Patient was prescribed 2 courses of Augmentin (one in in August, the second in September) which initially improved her symptoms for brief amount of time.  Patient currently using Flonase nasal spray with minimal improvement in symptoms.  Patient denies history of allergic rhinitis/seasonal allergies, aspirin sensitivity, throat clearing, voice hoarseness.  She is scheduled for a left endoscopic sinus surgery with left inferior turbinate outfracture with IGS on 2024 at [facility].     ACTIVE PROBLEMS  Patient Active Problem List   Diagnosis    Acute left-sided low back pain without sciatica    Injury of left elbow    Lumbar strain, initial encounter    Chronic maxillary sinusitis    Polyp of nasal cavity    Nasal drainage    Nasal obstruction    Lesion or mass of paranasal sinuses     PAST MEDICAL HISTORY  History reviewed. No pertinent past medical history.    SURGICAL HISTORY  Past Surgical History:   Procedure Laterality Date    COLONOSCOPY      OTHER SURGICAL HISTORY Left     Hip arthroscopy    OTHER SURGICAL HISTORY      Root canal surgery    WISDOM TOOTH EXTRACTION       ANESTHESIA HISTORY  Denies problems with anesthesia in the past such as PONV, prolonged sedation, awareness, dental damage, aspiration, cardiac arrest, difficult intubation, or unexpected hospital admissions.  Denies family history of malignant hyperthermia, or pseudocholinesterase deficiency.    SOCIAL HISTORY  Patient is single, no children, works as a  "registered  technician  Patient is a never smoker; EtOH: Occasional use; denies recreational drug use  Patient states she exercises 1-2 times a week either walking or running on a treadmill.  She states she is able to do moderate ADLs such as heavy housework, light yard work.  METS >4    FAMILY HISTORY  No family history on file.    ALLERGIES  No Known Allergies    MEDICATIONS  No current facility-administered medications for this encounter.    Current Outpatient Medications:     fluticasone (Flonase) 50 mcg/actuation nasal spray, SHAKE GENTLY. BEFORE FIRST USE, PRIME PUMP. SPRAY TOWARD OUTSIDE WALL OF NOSE., Disp: 48 mL, Rfl: 2    multivitamin tablet, Take 1 tablet by mouth once daily., Disp: , Rfl:     PHYSICAL EXAM  Deferred    AIRWAY EXAM  Deferred    VITALS  No vitals taken for telemedicine visit  Height: 5 feet 6 inches; weight: 186 pounds; BMI: 30.09    LABS  No results found for: \"WBC\", \"HGB\", \"HCT\", \"MCV\", \"PLT\"  No results found for: \"GLUCOSE\", \"CALCIUM\", \"NA\", \"K\", \"CO2\", \"CL\", \"BUN\", \"CREATININE\"    ASSESSMENT/PLAN  Chronic left maxillary sinusitis  Left endoscopic sinus surgery, left inferior turbinate outfracture was with IGS      This note was created in part upon personal review of patient's medical records.           "

## 2023-12-14 NOTE — PREPROCEDURE INSTRUCTIONS
Pre-Op Instructions & Checklist  Your surgery has been scheduled at [facility] at [address] [place], [address], [address], in the [facility]. Parking is to the left of the main entrance.  You will be contacted about the time of your surgery the day before your surgery. If you are unable to answer the phone, a detailed voicemail message will be left. Make sure that your voicemail box is not full so a message can be left. If you have not received a call by 3:00 pm you may call [phone] between the hours of 3:00 and 4:00 pm. Please be available by phone the night before/day of surgery in case there is a change in the schedule which may require you to arrive earlier/later.  14 DAYS BEFORE SURGERY STOP TAKING WEIGHT LOSS MEDICATIONS     7 DAYS BEFORE SURGERY STOP THESE MEDICATIONS:  Multiple Vitamins containing Vitamin E  Herbal supplements, Fish Oil, garlic pills, turmeric  Stop taking aspirin, and aspirin-containing products as well as NSAID's such as Advil, Motrin, Aleve, Ibuprofen. Tylenol is okay to take for pain relief.   If you are currently taking Coumadin/Warfarin, we will have to coordinate that with your PCP &/or the Anticoagulation Clinic.       THE DAY BEFORE SURGERY:  *Do not eat any food after midnight the night before surgery.   *You are permitted to have clear liquids such as water, apple juice, plain tea or coffee (no milk or creamer), clear electrolyte-replenishing drinks such as Pedialyte, Gatorade, or Powerade (not yogurt or pulp-containing smoothies or juices such as orange juice) up to 2 hours before your surgery.    DAY OF SURGERY, TAKE THESE MEDICATIONS with a small sip of water (if it is not listed, do not take it):    There are no medications for you to take the morning of surgery.     ON THE MORNING OF SURGERY:  *Shower either the night before your surgery or the morning of your surgery  *Do not use moisturizers, creams, lotions or perfume, or  make-up.  *Wear comfortable, loose fitting clothing.   *All jewelry and valuables should be left at home.  *Prosthetic devices such as contact lenses, hearing aids, dentures, eyelash extensions, hairpins and body piercing must be removed before surgery. Bring containers for eyeglasses/contacts, dentures, or hearing aids with you.  Diabetics: Please check fasting blood sugars upon waking up.  If fasting blood sugars are<80ml/dl, please drink 3 ounces of apple juice no later than 2 hours prior to surgery.    BRING WITH YOU:  *Photo ID and insurance card  *Current list of medicines and allergies  *Pacemaker/Defibrillator/Heart stent cards  *Copy of your complete Advanced Directive/DHPOA-if applicable      SMOKING:  *Quitting smoking can make a huge difference to your health and recovery from surgery.    *If you need help with quitting, call 7-319-QUIT-NOW.    Alcohol:  *No alcoholic beverages for 48 hours before surgery.    AFTER OUTPATIENT SURGERY:  *A responsible adult MUST accompany you at the time of discharge and stay with you for 24 hours after your surgery.  *You may NOT drive yourself home after surgery.  *You may use a taxi or ride sharing service (Lyft, Uber) to return home ONLY if you are accompanied by a friend or family member.  *Instructions for resuming your medications will be provided by your surgeon.    CONTACT SURGEON'S OFFICE IF YOU DEVELOP:  * Fever =/> 100.4 F   * New respiratory symptoms (e.g. cough, shortness of breath, respiratory distress, sore throat)  * Recent loss of taste or smell  *Flu like symptoms such as headache, fatigue or gastrointestinal symptoms  * If you develop any open sores, shingles, burning or painful urination   AND/OR:  * You no longer wish to have the surgery.  * Any other personal circumstances change that may lead to the need to cancel or defer this surgery.  *You were admitted to any hospital within one week of your planned procedure.      If you have any questions  regarding these preoperative instructions you may call [phone]. If you have questions regarding you surgical procedure, or post-operative care/recovery please call your surgeon's office.     Link to [facility] Getourguide  [email or web address]

## 2024-01-04 ENCOUNTER — ANESTHESIA EVENT (OUTPATIENT)
Dept: OPERATING ROOM | Facility: CLINIC | Age: 29
End: 2024-01-04
Payer: COMMERCIAL

## 2024-01-05 ENCOUNTER — ANESTHESIA (OUTPATIENT)
Dept: OPERATING ROOM | Facility: CLINIC | Age: 29
End: 2024-01-05
Payer: COMMERCIAL

## 2024-01-05 ENCOUNTER — HOSPITAL ENCOUNTER (OUTPATIENT)
Facility: CLINIC | Age: 29
Setting detail: OUTPATIENT SURGERY
Discharge: HOME | End: 2024-01-05
Attending: OTOLARYNGOLOGY | Admitting: OTOLARYNGOLOGY
Payer: COMMERCIAL

## 2024-01-05 VITALS
SYSTOLIC BLOOD PRESSURE: 125 MMHG | HEIGHT: 66 IN | RESPIRATION RATE: 16 BRPM | DIASTOLIC BLOOD PRESSURE: 57 MMHG | BODY MASS INDEX: 29.34 KG/M2 | HEART RATE: 70 BPM | OXYGEN SATURATION: 99 % | TEMPERATURE: 97 F | WEIGHT: 182.54 LBS

## 2024-01-05 DIAGNOSIS — J34.89 NASAL OBSTRUCTION: ICD-10-CM

## 2024-01-05 DIAGNOSIS — J33.0 POLYP OF NASAL CAVITY: ICD-10-CM

## 2024-01-05 DIAGNOSIS — J34.89 NASAL DRAINAGE: ICD-10-CM

## 2024-01-05 DIAGNOSIS — J34.89 LESION OR MASS OF PARANASAL SINUSES: ICD-10-CM

## 2024-01-05 DIAGNOSIS — J32.0 CHRONIC MAXILLARY SINUSITIS: ICD-10-CM

## 2024-01-05 LAB — PREGNANCY TEST URINE, POC: NEGATIVE

## 2024-01-05 PROCEDURE — 2500000005 HC RX 250 GENERAL PHARMACY W/O HCPCS: Performed by: NURSE ANESTHETIST, CERTIFIED REGISTERED

## 2024-01-05 PROCEDURE — 2500000004 HC RX 250 GENERAL PHARMACY W/ HCPCS (ALT 636 FOR OP/ED): Performed by: NURSE ANESTHETIST, CERTIFIED REGISTERED

## 2024-01-05 PROCEDURE — 7100000009 HC PHASE TWO TIME - INITIAL BASE CHARGE: Performed by: OTOLARYNGOLOGY

## 2024-01-05 PROCEDURE — 7100000010 HC PHASE TWO TIME - EACH INCREMENTAL 1 MINUTE: Performed by: OTOLARYNGOLOGY

## 2024-01-05 PROCEDURE — 2500000005 HC RX 250 GENERAL PHARMACY W/O HCPCS: Performed by: OTOLARYNGOLOGY

## 2024-01-05 PROCEDURE — 88305 TISSUE EXAM BY PATHOLOGIST: CPT | Performed by: PATHOLOGY

## 2024-01-05 PROCEDURE — 2720000007 HC OR 272 NO HCPCS: Performed by: OTOLARYNGOLOGY

## 2024-01-05 PROCEDURE — 2500000004 HC RX 250 GENERAL PHARMACY W/ HCPCS (ALT 636 FOR OP/ED): Performed by: STUDENT IN AN ORGANIZED HEALTH CARE EDUCATION/TRAINING PROGRAM

## 2024-01-05 PROCEDURE — 61782 SCAN PROC CRANIAL EXTRA: CPT | Performed by: OTOLARYNGOLOGY

## 2024-01-05 PROCEDURE — 7100000002 HC RECOVERY ROOM TIME - EACH INCREMENTAL 1 MINUTE: Performed by: OTOLARYNGOLOGY

## 2024-01-05 PROCEDURE — A31267 PR NASAL/SINUS ENDOSCOPY,RMV TISS MAXILL SINUS: Performed by: ANESTHESIOLOGY

## 2024-01-05 PROCEDURE — 88311 DECALCIFY TISSUE: CPT | Performed by: PATHOLOGY

## 2024-01-05 PROCEDURE — 81025 URINE PREGNANCY TEST: CPT | Performed by: STUDENT IN AN ORGANIZED HEALTH CARE EDUCATION/TRAINING PROGRAM

## 2024-01-05 PROCEDURE — 88305 TISSUE EXAM BY PATHOLOGIST: CPT | Mod: TC | Performed by: OTOLARYNGOLOGY

## 2024-01-05 PROCEDURE — 2500000001 HC RX 250 WO HCPCS SELF ADMINISTERED DRUGS (ALT 637 FOR MEDICARE OP): Performed by: STUDENT IN AN ORGANIZED HEALTH CARE EDUCATION/TRAINING PROGRAM

## 2024-01-05 PROCEDURE — 3600000003 HC OR TIME - INITIAL BASE CHARGE - PROCEDURE LEVEL THREE: Performed by: OTOLARYNGOLOGY

## 2024-01-05 PROCEDURE — 2500000002 HC RX 250 W HCPCS SELF ADMINISTERED DRUGS (ALT 637 FOR MEDICARE OP, ALT 636 FOR OP/ED): Performed by: STUDENT IN AN ORGANIZED HEALTH CARE EDUCATION/TRAINING PROGRAM

## 2024-01-05 PROCEDURE — 30930 THER FX NASAL INF TURBINATE: CPT | Performed by: OTOLARYNGOLOGY

## 2024-01-05 PROCEDURE — 3700000002 HC GENERAL ANESTHESIA TIME - EACH INCREMENTAL 1 MINUTE: Performed by: OTOLARYNGOLOGY

## 2024-01-05 PROCEDURE — 3600000008 HC OR TIME - EACH INCREMENTAL 1 MINUTE - PROCEDURE LEVEL THREE: Performed by: OTOLARYNGOLOGY

## 2024-01-05 PROCEDURE — A4217 STERILE WATER/SALINE, 500 ML: HCPCS | Performed by: OTOLARYNGOLOGY

## 2024-01-05 PROCEDURE — 3700000001 HC GENERAL ANESTHESIA TIME - INITIAL BASE CHARGE: Performed by: OTOLARYNGOLOGY

## 2024-01-05 PROCEDURE — 31267 ENDOSCOPY MAXILLARY SINUS: CPT | Performed by: OTOLARYNGOLOGY

## 2024-01-05 PROCEDURE — 7100000001 HC RECOVERY ROOM TIME - INITIAL BASE CHARGE: Performed by: OTOLARYNGOLOGY

## 2024-01-05 PROCEDURE — 2500000004 HC RX 250 GENERAL PHARMACY W/ HCPCS (ALT 636 FOR OP/ED): Performed by: OTOLARYNGOLOGY

## 2024-01-05 PROCEDURE — A31267 PR NASAL/SINUS ENDOSCOPY,RMV TISS MAXILL SINUS: Performed by: NURSE ANESTHETIST, CERTIFIED REGISTERED

## 2024-01-05 PROCEDURE — 2500000001 HC RX 250 WO HCPCS SELF ADMINISTERED DRUGS (ALT 637 FOR MEDICARE OP): Performed by: OTOLARYNGOLOGY

## 2024-01-05 RX ORDER — OXYMETAZOLINE HCL 0.05 %
SPRAY, NON-AEROSOL (ML) NASAL AS NEEDED
Status: DISCONTINUED | OUTPATIENT
Start: 2024-01-05 | End: 2024-01-05 | Stop reason: HOSPADM

## 2024-01-05 RX ORDER — LIDOCAINE HYDROCHLORIDE 20 MG/ML
INJECTION, SOLUTION INFILTRATION; PERINEURAL AS NEEDED
Status: DISCONTINUED | OUTPATIENT
Start: 2024-01-05 | End: 2024-01-05

## 2024-01-05 RX ORDER — ONDANSETRON HYDROCHLORIDE 2 MG/ML
4 INJECTION, SOLUTION INTRAVENOUS ONCE AS NEEDED
Status: DISCONTINUED | OUTPATIENT
Start: 2024-01-05 | End: 2024-01-05 | Stop reason: HOSPADM

## 2024-01-05 RX ORDER — ALBUTEROL SULFATE 0.83 MG/ML
2.5 SOLUTION RESPIRATORY (INHALATION) ONCE AS NEEDED
Status: DISCONTINUED | OUTPATIENT
Start: 2024-01-05 | End: 2024-01-05 | Stop reason: HOSPADM

## 2024-01-05 RX ORDER — DEXAMETHASONE SODIUM PHOSPHATE 100 MG/10ML
INJECTION INTRAMUSCULAR; INTRAVENOUS AS NEEDED
Status: DISCONTINUED | OUTPATIENT
Start: 2024-01-05 | End: 2024-01-05

## 2024-01-05 RX ORDER — APREPITANT 40 MG/1
40 CAPSULE ORAL ONCE
Status: COMPLETED | OUTPATIENT
Start: 2024-01-05 | End: 2024-01-05

## 2024-01-05 RX ORDER — ROCURONIUM BROMIDE 10 MG/ML
INJECTION, SOLUTION INTRAVENOUS AS NEEDED
Status: DISCONTINUED | OUTPATIENT
Start: 2024-01-05 | End: 2024-01-05

## 2024-01-05 RX ORDER — CEFAZOLIN 1 G/1
INJECTION, POWDER, FOR SOLUTION INTRAVENOUS AS NEEDED
Status: DISCONTINUED | OUTPATIENT
Start: 2024-01-05 | End: 2024-01-05

## 2024-01-05 RX ORDER — OXYCODONE AND ACETAMINOPHEN 5; 325 MG/1; MG/1
1 TABLET ORAL EVERY 4 HOURS PRN
Status: DISCONTINUED | OUTPATIENT
Start: 2024-01-05 | End: 2024-01-05 | Stop reason: HOSPADM

## 2024-01-05 RX ORDER — MIDAZOLAM HYDROCHLORIDE 1 MG/ML
INJECTION, SOLUTION INTRAMUSCULAR; INTRAVENOUS AS NEEDED
Status: DISCONTINUED | OUTPATIENT
Start: 2024-01-05 | End: 2024-01-05

## 2024-01-05 RX ORDER — SODIUM CHLORIDE 0.9 G/100ML
IRRIGANT IRRIGATION AS NEEDED
Status: DISCONTINUED | OUTPATIENT
Start: 2024-01-05 | End: 2024-01-05 | Stop reason: HOSPADM

## 2024-01-05 RX ORDER — LIDOCAINE HYDROCHLORIDE AND EPINEPHRINE 10; 10 MG/ML; UG/ML
INJECTION, SOLUTION INFILTRATION; PERINEURAL AS NEEDED
Status: DISCONTINUED | OUTPATIENT
Start: 2024-01-05 | End: 2024-01-05 | Stop reason: HOSPADM

## 2024-01-05 RX ORDER — FENTANYL CITRATE 50 UG/ML
50 INJECTION, SOLUTION INTRAMUSCULAR; INTRAVENOUS EVERY 5 MIN PRN
Status: DISCONTINUED | OUTPATIENT
Start: 2024-01-05 | End: 2024-01-05 | Stop reason: HOSPADM

## 2024-01-05 RX ORDER — SODIUM CHLORIDE, SODIUM LACTATE, POTASSIUM CHLORIDE, CALCIUM CHLORIDE 600; 310; 30; 20 MG/100ML; MG/100ML; MG/100ML; MG/100ML
100 INJECTION, SOLUTION INTRAVENOUS CONTINUOUS
Status: DISCONTINUED | OUTPATIENT
Start: 2024-01-05 | End: 2024-01-05 | Stop reason: HOSPADM

## 2024-01-05 RX ORDER — PROPOFOL 10 MG/ML
INJECTION, EMULSION INTRAVENOUS AS NEEDED
Status: DISCONTINUED | OUTPATIENT
Start: 2024-01-05 | End: 2024-01-05

## 2024-01-05 RX ORDER — TRAMADOL HYDROCHLORIDE 50 MG/1
50 TABLET ORAL EVERY 6 HOURS PRN
Qty: 15 TABLET | Refills: 0 | Status: SHIPPED | OUTPATIENT
Start: 2024-01-05

## 2024-01-05 RX ORDER — FENTANYL CITRATE 50 UG/ML
INJECTION, SOLUTION INTRAMUSCULAR; INTRAVENOUS AS NEEDED
Status: DISCONTINUED | OUTPATIENT
Start: 2024-01-05 | End: 2024-01-05

## 2024-01-05 RX ORDER — DOXYCYCLINE 100 MG/1
100 CAPSULE ORAL 2 TIMES DAILY
Qty: 14 CAPSULE | Refills: 0 | Status: SHIPPED | OUTPATIENT
Start: 2024-01-05 | End: 2024-01-12

## 2024-01-05 RX ORDER — ONDANSETRON HYDROCHLORIDE 2 MG/ML
INJECTION, SOLUTION INTRAVENOUS AS NEEDED
Status: DISCONTINUED | OUTPATIENT
Start: 2024-01-05 | End: 2024-01-05

## 2024-01-05 RX ORDER — ACETAMINOPHEN 325 MG/1
650 TABLET ORAL EVERY 4 HOURS PRN
Status: DISCONTINUED | OUTPATIENT
Start: 2024-01-05 | End: 2024-01-05 | Stop reason: HOSPADM

## 2024-01-05 RX ORDER — FENTANYL CITRATE 50 UG/ML
25 INJECTION, SOLUTION INTRAMUSCULAR; INTRAVENOUS EVERY 5 MIN PRN
Status: DISCONTINUED | OUTPATIENT
Start: 2024-01-05 | End: 2024-01-05 | Stop reason: HOSPADM

## 2024-01-05 RX ORDER — HYDRALAZINE HYDROCHLORIDE 20 MG/ML
5 INJECTION INTRAMUSCULAR; INTRAVENOUS EVERY 30 MIN PRN
Status: DISCONTINUED | OUTPATIENT
Start: 2024-01-05 | End: 2024-01-05 | Stop reason: HOSPADM

## 2024-01-05 RX ADMIN — MIDAZOLAM 1 MG: 1 INJECTION INTRAMUSCULAR; INTRAVENOUS at 14:03

## 2024-01-05 RX ADMIN — SODIUM CHLORIDE, SODIUM LACTATE, POTASSIUM CHLORIDE, AND CALCIUM CHLORIDE: .6; .31; .03; .02 INJECTION, SOLUTION INTRAVENOUS at 13:56

## 2024-01-05 RX ADMIN — PROPOFOL 170 MG: 10 INJECTION, EMULSION INTRAVENOUS at 14:03

## 2024-01-05 RX ADMIN — ROCURONIUM BROMIDE 40 MG: 50 INJECTION INTRAVENOUS at 14:03

## 2024-01-05 RX ADMIN — ONDANSETRON 4 MG: 2 INJECTION INTRAMUSCULAR; INTRAVENOUS at 14:31

## 2024-01-05 RX ADMIN — SUGAMMADEX 200 MG: 100 INJECTION, SOLUTION INTRAVENOUS at 14:43

## 2024-01-05 RX ADMIN — APREPITANT 40 MG: 40 CAPSULE ORAL at 11:38

## 2024-01-05 RX ADMIN — LIDOCAINE HYDROCHLORIDE 50 MG: 20 INJECTION, SOLUTION INFILTRATION; PERINEURAL at 14:03

## 2024-01-05 RX ADMIN — FENTANYL CITRATE 50 MCG: 50 INJECTION, SOLUTION INTRAMUSCULAR; INTRAVENOUS at 14:03

## 2024-01-05 RX ADMIN — DEXAMETHASONE SODIUM PHOSPHATE 10 MG: 10 INJECTION INTRAMUSCULAR; INTRAVENOUS at 14:15

## 2024-01-05 RX ADMIN — SODIUM CHLORIDE, SODIUM LACTATE, POTASSIUM CHLORIDE, AND CALCIUM CHLORIDE 100 ML/HR: .6; .31; .03; .02 INJECTION, SOLUTION INTRAVENOUS at 11:50

## 2024-01-05 RX ADMIN — OXYCODONE HYDROCHLORIDE AND ACETAMINOPHEN 1 TABLET: 5; 325 TABLET ORAL at 15:10

## 2024-01-05 RX ADMIN — CEFAZOLIN 2 G: 1 INJECTION, POWDER, FOR SOLUTION INTRAMUSCULAR; INTRAVENOUS at 14:14

## 2024-01-05 RX ADMIN — SODIUM CHLORIDE 0.05 MCG/KG/MIN: 9 INJECTION, SOLUTION INTRAVENOUS at 14:12

## 2024-01-05 SDOH — HEALTH STABILITY: MENTAL HEALTH: CURRENT SMOKER: 0

## 2024-01-05 ASSESSMENT — COLUMBIA-SUICIDE SEVERITY RATING SCALE - C-SSRS
6. HAVE YOU EVER DONE ANYTHING, STARTED TO DO ANYTHING, OR PREPARED TO DO ANYTHING TO END YOUR LIFE?: NO
1. IN THE PAST MONTH, HAVE YOU WISHED YOU WERE DEAD OR WISHED YOU COULD GO TO SLEEP AND NOT WAKE UP?: NO
2. HAVE YOU ACTUALLY HAD ANY THOUGHTS OF KILLING YOURSELF?: NO

## 2024-01-05 ASSESSMENT — PAIN SCALES - GENERAL
PAINLEVEL_OUTOF10: 6
PAINLEVEL_OUTOF10: 6
PAIN_LEVEL: 2
PAINLEVEL_OUTOF10: 0 - NO PAIN
PAINLEVEL_OUTOF10: 6
PAINLEVEL_OUTOF10: 3
PAINLEVEL_OUTOF10: 4

## 2024-01-05 ASSESSMENT — PAIN - FUNCTIONAL ASSESSMENT
PAIN_FUNCTIONAL_ASSESSMENT: 0-10

## 2024-01-05 NOTE — ANESTHESIA PROCEDURE NOTES
Airway  Date/Time: 1/5/2024 2:05 PM  Urgency: elective    Airway not difficult    Staffing  Performed: CRNA   Authorized by: Phoenix Vo MD    Performed by: NEIL Anthony-HEIDI  Patient location during procedure: OR    Indications and Patient Condition  Indications for airway management: anesthesia  Spontaneous Ventilation: absent  Sedation level: deep  Preoxygenated: yes  Patient position: sniffing  Mask difficulty assessment: 1 - vent by mask    Final Airway Details  Final airway type: endotracheal airway      Successful airway: ETT  Cuffed: yes   Successful intubation technique: direct laryngoscopy  Endotracheal tube insertion site: oral  Blade: [place]  Blade size: #3  ETT size (mm): 7.0  Cormack-Lehane Classification: grade I - full view of glottis  Placement verified by: chest auscultation and capnometry   Measured from: teeth  ETT to teeth (cm): 22  Number of attempts at approach: 1  Number of other approaches attempted: 0

## 2024-01-05 NOTE — DISCHARGE INSTRUCTIONS
Nasal and Sinus Surgery at Home Instructions  The following instructions will help you know what to expect in the days following your surgery.     Please have the following items available at your home/recovery residence:  · NeilMed Sinus Rinse® bottle or equivalent for post-surgical nasal irrigations  · Oxymetazoline (Afrin®) or Phenylephrine (Lai-Synephrine®) are topical decongestant sprays   - they should ONLY be used if you have a nosebleed or excessive bleeding  · 4 x 4 gauze and paper tape  · Tylenol® (adjunct to prescription therapy or as sole pain medication)    Activities  · You may shower the same day as your surgery   · Avoid sneezing or blowing your nose for 2 weeks after surgery / if you do sneeze, do so with your mouth open  · Avoid strenuous activity for 2 weeks after surgery / do not lift heavy objects (greater than 10 pounds) for 1 week after surgery  · Walking is strongly encouraged after surgery   · Most patients return to work without about 5 to 7 days after surgery but this is variable    Diet  · You can resume a normal diet within 24 hours of the surgery      Fever  · A low-grade fever, less than 101° F is not uncommon for 2 to 3 days after surgery. If fever continues or is higher than 101° F, call your physician.  You can use Tylenol® to control the fever.      Pain Control  · Pain is variable after nasal surgery but is generally well controlled with pain medication.  Most patients feel pressure and congestion.  Pain should lessen with time. If pain increases, call our office.  · For mild pain, acetaminophen (Tylenol®) is recommended.  We suggest scheduling 500-1000 mg of acetaminophen every 6 hours for the first several days (unless you are allergic to this medicine or have problems with your liver).  Do not take more than 4000mg in a day.  We will also prescribe stronger pain medication for after surgery. Drink plenty of water as these stronger pain medications can be constipating.  We  also recommend that you take stool softeners on a regular basis while taking narcotic pain medication.  -Do not take ibuprofen, Advil, Motrin, aspirin, or other NSAIDS (non-steroidal anti-inflammatory). These medications increase your risk of bleeding.    Drainage  · You can expect to have bloody nasal drainage after nasal or sinus surgery. To catch   this drainage and to help avoid continuous nose wiping, we usually put a gauze “mustache” dressing under the nose and tape it to the cheeks for as long as the drainage continues.    · BLEEDING: Some blood in the nasal drainage after surgery is expected. This may be red, dark red or brown. One way to monitor this is to tape a piece of gauze under the nose to collect the bloody drainage. This may saturate with blood every 1 to 2 hours for the first few days after surgery. If it saturates completely with blood (blood dripping off of it and totally red) MORE FREQUENTLY THAN EVERY 30 MINUTES then call our office. Avoid nose blowing and try to sneeze with your mouth open.  Sleeping with your head elevated for at least the first night will also help prevent bleeding and reduce congestion. If you have a nosebleed, try spraying a topical decongestant spray (see page 1) into the side of bleeding 2-3 sprays and hold gentle pressure for 10 minutes.  If the bleeding continues after this is done twice call our office.       Care of the Nose  ·   NASAL SALINE IRRIGATION: THIS IS VERY IMPORTANT - Please START IRRIGATIONS THE MORNING AFTER SURGERY. After sinus surgery, we like to have the nose irrigated with a NeilMed Sinus Rinse® bottle (irrigation instructions and saline solution recipe are listed). This will help rinse the blood clots and mucous from the nose.   We recommend doing your nasal irrigations AT LEAST 3 TIMES A DAY UNTIL YOUR FOLLOW UP WITH YOUR SURGEON. IF YOU CAN DO IT 5-6 TIMES A DAY THAT WOULD BE IDEAL.  · Try not to manipulate your nose with your fingers     How to  Irrigate  · Fill the NeilMed Sinus Rinse® bottle with the room temperature saline solution (see below for recipe). Gently insert the tip into one nostril and squeeze. Keep your head down and blow out through your mouth when you irrigate to prevent water from going back down into your throat. Use about one half of the bottle per nostril. Do this for each nostril at least three times daily until your follow up with your surgeon. The more you can do the irrigation the better. You will likely be irrigating regularly for at least a month or two.    Nasal Irrigation Solution Recipe  · 8 ounces of room temperature distilled water. (If you use tap water, boil it first and let it cool to room temperature.)  · ½ teaspoon of table salt  · ½ teaspoon of baking soda  You can use the NeilMed Sinus Rinse® solution packets if preferred    Follow up  Your appointment for follow up after your surgery should have been scheduled at the time of your surgery. If not, call the office for an appointment scheduled for 1-2 weeks after the date of your surgery.    Call the office or GO TO THE EMERGENCY ROOM if you have:  · Excessive pain, swelling, bleeding or drainage  · Shortness of breath or difficulty breathing  · Persistent nausea and vomiting  · Fever that continues or is higher than 101° F  · Neck stiffness (cannot touch your chin to your chest)  · Confusion  · Worsening headaches that do not respond to pain medication  · Reproducible clear drainage dripping from your nose like a leaky faucet (particularly one sided).  Note:  This may happen and is normal up to 30 minutes following saline irrigations or sprays but if it is reproducible despite stopping saline please contact our office   · Salty or metallic taste (note that you may experience a salty taste that is normal up to 30 minutes following saline use)    Do not hesitate to call if you have any questions or concerns:  Offices of Otolaryngology - Division of Rhinology  Dr. Casarez  [phone]  Normal office hours are:  8am-4pm Monday - Friday   If there is an after-hours EMERGENCY related to your procedure please call [phone] (ask for the “ENT resident on-call”).

## 2024-01-05 NOTE — ANESTHESIA POSTPROCEDURE EVALUATION
Patient: Akosua Bo    Procedure Summary       Date: 01/05/24 [place] [facility] [place] [facility] [place] [facility] [place] [facility] [place]    Anesthesia Start: 1359 Anesthesia Stop: 1501    Procedures:       LEFT endoscopic sinus surgery, left inferior turbinate outfracture with IGS (Left: Nose)      Fracture Therapeutic Nasal Bone (Left: Nose)      Navigation-Assisted Surgery (Nose) Diagnosis:       Chronic maxillary sinusitis      Polyp of nasal cavity      Nasal drainage      Nasal obstruction      Lesion or mass of paranasal sinuses      (Chronic maxillary sinusitis [J32.0])      (Polyp of nasal cavity [J33.0])      (Nasal drainage [J34.89])      (Nasal obstruction [J34.89])      (Lesion or mass of paranasal sinuses [J34.89])    Surgeons: Jarad Casarez MD Responsible Provider: Phoenix Vo MD    Anesthesia Type: general ASA Status: 2            Anesthesia Type: general    Vitals Value Taken Time   /61 01/05/24 1501   Temp 36.1 01/05/24 1501   Pulse 70 01/05/24 1501   Resp 16 01/05/24 1501   SpO2 100 01/05/24 1501       Anesthesia Post Evaluation    Patient location during evaluation: PACU  Patient participation: complete - patient participated  Level of consciousness: awake  Pain score: 2  Pain management: adequate  Multimodal analgesia pain management approach  Airway patency: patent  Two or more strategies used to mitigate risk of obstructive sleep apnea  Cardiovascular status: acceptable  Respiratory status: acceptable  Hydration status: acceptable  Postoperative Nausea and Vomiting: none      There were no known notable events for this encounter.

## 2024-01-05 NOTE — INTERVAL H&P NOTE
H&P reviewed. The patient was examined and there are no changes to the H&P.    CV: Peripheral perfusion intact, no cyanosis clubbing or edema  Resp: No stridor or stertor, good chest expansion

## 2024-01-05 NOTE — ANESTHESIA PREPROCEDURE EVALUATION
Patient: Akosua Bo    Procedure Information       Date/Time: 01/05/24 1300    Procedures:       LEFT endoscopic sinus surgery, left inferior turbinate outfracture with IGS (Left)      Fracture Therapeutic Nasal Bone (Left)      Navigation-Assisted Surgery    [place] [facility] [place] [facility] [place] 01 / [facility] [place] [facility] [place]    Surgeons: Jarad Casarez MD            Relevant Problems   Anesthesia (within normal limits)      Cardiovascular (within normal limits)      Endocrine (within normal limits)      GI (within normal limits)      /Renal (within normal limits)      Neuro/Psych (within normal limits)      Pulmonary (within normal limits)      GI/Hepatic (within normal limits)      Hematology (within normal limits)      Musculoskeletal (within normal limits)      Infectious Disease (within normal limits)       Clinical information reviewed:   Tobacco  Allergies  Meds   Med Hx  Surg Hx  OB Status  Fam Hx  Soc   Hx        NPO Detail:  NPO/Void Status  Carbonhydrate Drink Given Prior to Surgery? : N  Date of Last Liquid: 01/04/24  Time of Last Liquid: 2200  Date of Last Solid: 01/04/24  Time of Last Solid: 2200  Last Intake Type: Clear fluids  Time of Last Void: 1125         PHYSICAL EXAM    Anesthesia Plan    ASA 2     general     The patient is not a current smoker.  Patient was not previously instructed to abstain from smoking on day of procedure.  Patient did not smoke on day of procedure.    Anesthetic plan and risks discussed with patient.  Use of blood products discussed with father who.    Plan discussed with attending.

## 2024-01-05 NOTE — OP NOTE
Operative Note     Date: 2024  [place] [facility] [place] [facility] [place]    Name: Akosua Bo, : 1995, Age: 28 y.o., MRN: [id number], Sex: female    Diagnosis  Pre-op Diagnosis     * Chronic maxillary sinusitis [J32.0]     * Polyp of nasal cavity [J33.0]     * Nasal drainage [J34.89]     * Nasal obstruction [J34.89]     * Lesion or mass of paranasal sinuses [J34.89] Post-op Diagnosis     * Chronic maxillary sinusitis [J32.0]     * Polyp of nasal cavity [J33.0]     * Nasal drainage [J34.89]     * Nasal obstruction [J34.89]     * Lesion or mass of paranasal sinuses [J34.89]     Operation/Procedure(s):     1. Left endoscopic maxillary antrostomy with removal of tissue from sinus  2. Left inferior turbinate outfracture  3. Image guidance navigation - extradural    Surgeon: Jarad Casarez MD FACS    Assistant(s): None    EBL: 50 ml    Anesthesia: General and local     Operative Findings:  1. Mass lesion in left nasal cavity eminating from left maxillary sinus consistent with nasal sinus polyp and left maxillary sinus with large cyst  2. Absorbable hemostatic material in the left nasal cavity    Operative Indications:   Akosua Bo is a 28 y.o.  female  with a history of chronic rhinosinusitis - Left chronic maxillary sinusitis, mass. The patient was treated with maximal medical therapy and a post-treatment CT scan showed persistent disease. Therefore, the risks, benefits, and alternatives of the above procedures were discussed and the patient agreed to proceed. Please see the ambulatory EMR for full documentation and detail of this discussion.    Operative/Procedure Narrative:   The patient was brought into the operating room and laid in a supine position on the operating room table. A surgical huddle was conducted to verify the patient and the procedure to be performed. General anesthesia was induced and the patient's airway was secured with an ET tube. A time out was called. The nasal cavities were sprayed  with 0.5% Afrin. The right and left nasal cavities were then injected with lidocaine 1% with 1:100,000 epinephrine. Next, image guidance was attached and registered. The image guidance was used through the procedure for identification of critical landmarks. Once the image guidance was calibrated, the nasal cavities were examined with a 0 degree endoscope.    The patient was noted to have enlarged inferior turbinates on the right and left, a broad septal deviation to the right.  The left inferior turbinate was outfractured with a Boies elevator. Using a zero degree endoscope for visualization and a Conroe elevator on the left, the middle turbinate was medialized. A large watery mass was noted in the nasal cavity extending to the nasopharynx. The double ball probe and backbiting forceps were used to perform a retrograde uncinectomy. The probe was then used to cannulate the maxillary os and a large maxillary antrostomy was made with a straight through cutting forceps. A large cyst was noted within the left maxillary sinus. This was opened and removed. The edges were cleaned up with the microdebrider. The maxillary sinus was entered and suctioned and tissue was removed.     The nasopharynx was then suctioned and the nose was copiously irrigated with normal saline. It was inspected for any bleeding and none was noted. Absorbable hemostatic material was placed in the left nasal cavity. This completed the surgical procedure. An OG tube was passed to suction out gastric contents. The patient was turned over to the anesthesia team for awakening and extubation. They were transferred to the PACU in stable condition.     Implants/Packing: Absorbable hemostatic material      Specimens:   Sinonasal contents to pathology     Complications:   None    Attestation: I, Jarad Casarez, was present for and completed all key portions of the procedure and there were no qualified residents available to assist me.    Jarad Casarez  Phone  Number: [phone]

## 2024-01-11 NOTE — PATIENT INSTRUCTIONS
Please observe the following post-op precautions for 2 weeks from the date of your surgery (until at least January 19):  Please refrain from blowing your nose. Please do not stifle any sneezes. If you must sneeze, then try to sneeze through an open mouth. Please do not stick anything in your nose other than any medicine or irrigations we recommend. Please do not insert a Q-tip or finger in the nose at this time as this will cause further trauma. Please refrain from any activities that will increase your heart rate or blood pressure. Try to keep your head above your heart as much as possible. Please refrain from lifting objects greater than 10 lbs. Please avoid all travel outside your local area during this 2-week precaution period.    Saline Irrigations:  Please continue doing back-to-back saline irrigations at least twice daily. This means 4 irrigations per day.  To prepare a saline irrigation: Add 1 NeilMed salt packet to 8 ounces of distilled water, or use the recipe below to make your own saline solution. Use half of the solution (4 ounces) in each nostril.  Please start using Flonase. Do 2 sprays in each nostril twice daily. Please irrigate first before using Flonase. Otherwise, you will wash Flonase out of your nose with the irrigation. Be sure to aim the Flonase spray slightly outwards toward the corner of the eye on the same side nostril.    Recipe to Make your Own Nasal Saline Solution:  Mix 8 ounces of distilled water or tap water (be sure to boil it then let it cool down) with 1/2 teaspoon of baking soda and 1/2 teaspoon of table salt and shake bottle to dissolve.    Please follow up with me on Friday, January 26 at 9 am for your second postoperative visit. Please feel free to contact my office by calling [phone] with any questions.    Irrigation Technique:  Stand with your head forward over the sink or in the bathtub to prevent water from going down into your throat. The goal is to get the  irrigation back out of the opposite nostril after irrigation. Irrigate each side of the nose with 4 ounces (about 120 mL) of the solution. You may buy the salt packets that come with the NeilMed irrigation bottle or use the recipe provided above to make your own nasal saline. Irrigate each side of the nose with mild force/squeezing of the bottle. If you feel like the solution is going into your ears, adjust your head angle or use less force with the bottle. The first couple times you use the solution, your nose may burn a bit, but this will go away with time.    Scribe Attestation  By signing my name below, I, Melvin Garces, attest that this documentation has been prepared under the direction and in the presence of Jarad Casarez MD. All medical record entries made by the Scribe were at my direction or personally dictated by me. I have reviewed the chart and agree that the record accurately reflects my personal performance of the history, physical exam, discussion and plan.

## 2024-01-11 NOTE — PROGRESS NOTES
HPI   Akosua Bo is a 28 y.o. old female h/o chronic maxillary sinusitis, polyp of nasal cavity, nasal obstruction/drainage s/p Left ESS (Max), Left VICENTE. The patient had surgery 1/5/2024.  1/12/24 - Patient presents for her 1st post op visit. She reports doing well denies acute concerns. She reports expected post operative congestion and edema. The patient has been doing saline irrigations at least 3 times daily, which have been productive of crusts and dried/old blood. She denies excessive bleeding, constant clear fluid from nose, severe headaches, double vision, or fevers.    Operative Report:  Date of Service: 1/5/2024  [place] [facility] [place] [facility] [place]    Post-op Diagnoses:  * Chronic maxillary sinusitis [J32.0]  * Polyp of nasal cavity [J33.0]  * Nasal drainage [J34.89]  * Nasal obstruction [J34.89]  * Lesion or mass of paranasal sinuses [J34.89]    Operation/Procedures:  1. Left endoscopic maxillary antrostomy with removal of tissue from sinus  2. Left inferior turbinate outfracture  3. Image guidance navigation - extradural     Operative Findings:  1. Mass lesion in left nasal cavity eminating from left maxillary sinus consistent with nasal sinus polyp and left maxillary sinus with large cyst  2. Absorbable hemostatic material in the left nasal cavity    Surgeon: Jarad Casarez MD FACS  Assistant: None  EBL: 50 ml  Anesthesia: General and local    Per Caitlin Cisneros's initial note 10/10/2023:  Akosua Bo is a 28 y.o. old female. The patient describes the sinus/nose problems as sudden onset of obstruction in the left nostril. She notes that about 3 days ago, she did have a large amount of bloody mucous dislodge from her nose and has experienced benefit since then. Her facial pressure has resolved since then. Symptoms initially began in August when she thought she had a sinus infection, with colored drainage and nasal obstruction, which improved following 2 courses of Augmentin. Patient denies facial  pressure, rhinorrhea, facial pain, periorbital edema, nasal obstruction, throat clearing, hoarseness, loss of taste, and loss of smell. The patient denies epistaxis. Prior history of epistaxis in high school that resolved with cautery on the right. The patient has taken Augmentin medications to alleviate the problem. The medication Augmentin has helped. The patient has not tried nasal saline irrigations. Does not have a history of allergic rhinitis or allergies. They deny a history of aspirin sensitivity. They report 0 sinus infections per year requiring antibiotic treatment. Most recent antibiotic usage includes: Augmentin x 7 days (August), Augmentin x 5 days (in Sept).     No history of prior nasal/sinus surgery or procedure. No prior history of nasal polyps.  Occupation:     Review of Systems   Negative for constitutional, eyes, cardiac, pulmonary, hepatic, renal, digestive, hematologic, epileptic, syncopal, musculoskeletal, mental health, integumentary, hypertensive, lipid, arthritic, diabetic, thyroid or neurologic disorders (except as listed in the HPI, PMH and Problem List).    Assessment   Akosua Bo is a 28 y.o. female h/o chronic maxillary sinusitis, polyp of nasal cavity, nasal obstruction/drainage s/p Left ESS (Max), Left IVCENTE. The patient had surgery 1/5/2024.  1/12/24 - 1st post op. Expected post op swelling today. Debridement performed as noted. Pathology pending. Continue saline irrigations at least BID. Start Flonase 2 sprays BID.    Plan   I previously reviewed the patient's CT scan images and results. I previously discussed the results personally with the patient. The following findings were discussed: 10/25/23: CT Sinus: Shows complete opacification of the left maxillary sinus. There is a right septal deviation and bilateral inferior turbinate hypertrophy. There appears to be a component of the mass that extends into the nasal cavity. Otherwise, paranasal sinuses  are clear.    Nasal Endoscopy with debridement. Findings: expected post op swelling.  Patient was instructed to continue nasal saline irrigations at least twice daily.  Patient was instructed to start using Flonase after saline irrigations, 2 sprays in each nostril twice daily.  Reassurance provided to patient, the nose is healing and edema at this point is expected.  Follow up as scheduled for repeat evaluation     Referring Provider: Caitlin Cisneros CNP  I will provide a report to the referring provider via the electronic medical record or US mail.    Jarad Casarez MD [facility]  Division of Rhinology, Sinus, and Skull Base Surgery       Exam   General: This is a healthy appearing female who appears her stated age. The patient is alert and appropriately verbally conversant without hoarseness.  Face: The face was inspected and no cutaneous masses or lesions were visualized. There was no erythema or edema noted. Facial movement was symmetric without weakness. No skin lesions were detected.  Eyes: Extra-ocular muscle function was intact. No nystagmus was observed. Pupils were equal.    Nose: Examination of the nose revealed the nasal dorsum to be midline. Intranasal exam reveals the septum is healthy without perforation. The inferior turbinates were expectedly edematous. Crusts and dried secretions noted on anterior rhinoscopy. See below procedure note as applicable for further exam.    Procedure Note:  Procedure: Nasal endoscopy with debridement - left  Indication: Chronic rhinosinusitis, post operative from sinus surgery  Informed consent obtained: risks, benefits, alternatives, and expectations discussed with patient and the patient wishes to proceed.    Findings: After anesthesia and decongestion with topical lidocaine and Afrin spray, the nasal cavities were examined and debrided with a zero and/or 30-degree endoscope. Large crusts were taken down from the anterior nasal chambers with a straight Blakesley forceps.  Crusts were debrided and removed from the middle meatus and maxillary cavity on the left. The maxillary sinuses are widely patent. No pus or polyps were noted. There is no CSF leak. Things are healing well. The patient tolerated the procedure well and there were no complications.       Emiibe Attestation  By signing my name below, I, Melvin Garces, attest that this documentation has been prepared under the direction and in the presence of Jarad Casarez MD. All medical record entries made by the Melvin were at my direction or personally dictated by me. I have reviewed the chart and agree that the record accurately reflects my personal performance of the history, physical exam, discussion and plan.

## 2024-01-12 ENCOUNTER — OFFICE VISIT (OUTPATIENT)
Dept: OTOLARYNGOLOGY | Facility: CLINIC | Age: 29
End: 2024-01-12
Payer: COMMERCIAL

## 2024-01-12 VITALS — BODY MASS INDEX: 28.94 KG/M2 | WEIGHT: 180.1 LBS | TEMPERATURE: 96.8 F | HEIGHT: 66 IN

## 2024-01-12 DIAGNOSIS — J32.0 CHRONIC MAXILLARY SINUSITIS: Primary | ICD-10-CM

## 2024-01-12 DIAGNOSIS — J34.89 NASAL CRUSTING: ICD-10-CM

## 2024-01-12 PROCEDURE — 1036F TOBACCO NON-USER: CPT | Performed by: OTOLARYNGOLOGY

## 2024-01-12 PROCEDURE — 31237 NSL/SINS NDSC SURG BX POLYPC: CPT | Performed by: OTOLARYNGOLOGY

## 2024-01-12 PROCEDURE — 99024 POSTOP FOLLOW-UP VISIT: CPT | Performed by: OTOLARYNGOLOGY

## 2024-01-16 LAB
LABORATORY COMMENT REPORT: NORMAL
PATH REPORT.FINAL DX SPEC: NORMAL
PATH REPORT.GROSS SPEC: NORMAL
PATH REPORT.RELEVANT HX SPEC: NORMAL
PATH REPORT.TOTAL CANCER: NORMAL

## 2024-01-25 NOTE — PATIENT INSTRUCTIONS
PATIENT INSTRUCTIONS ARE COMPLETE and READY TO PRINT     Saline Irrigations:  Please continue doing saline irrigations twice daily.  To prepare a saline irrigation: Add 1 NeilMed salt packet to 8 ounces of distilled water, or use the recipe below to make your own saline solution. Use half of the solution (4 ounces) in each nostril.    Flonase:  Please start using Flonase (fluticasone). Do 1 sprays in each nostril twice daily. Please irrigate first before using Flonase. Otherwise, you will wash Flonase out of your nose with the irrigation. Be sure to aim the Flonase spray slightly outwards toward the corner of the eye on the same side nostril.    Recipe to Make your Own Nasal Saline Solution:  Mix 8 ounces of distilled water or tap water (be sure to boil it then let it cool down) with 1/2 teaspoon of baking soda and 1/2 teaspoon of table salt and shake bottle to dissolve.    Please follow up with me in 2 months for reevaluation or sooner with any questions or concerns. Please feel free to contact my office at [phone] with any questions.    Scribe Attestation  By signing my name below, I, Melvin Garces, attest that this documentation has been prepared under the direction and in the presence of Jarad Casarez MD. All medical record entries made by the Scribe were at my direction or personally dictated by me. I have reviewed the chart and agree that the record accurately reflects my personal performance of the history, physical exam, discussion and plan.

## 2024-01-25 NOTE — PROGRESS NOTES
HPI   Akosua Bo is a 28 y.o. old female h/o chronic maxillary sinusitis, polyp of nasal cavity, nasal obstruction/drainage s/p Left ESS (Max), Left VICENTE. The patient had surgery 1/5/2024.  1/12/24 - Patient presents for her 1st post op visit. She reports doing well denies acute concerns. She reports expected post operative congestion and edema. The patient has been doing saline irrigations at least 3 times daily, which have been productive of crusts and dried/old blood. She denies excessive bleeding, constant clear fluid from nose, severe headaches, double vision, or fevers.  1/26/24 - Patient presents for her 2nd post op visit. She reports doing well, except for mild pain when blowing her nose and mild obstruction from the current changing weather conditions. She gets some mild expected occasional discolored yellow drainage. She is performing saline irrigations twice daily. She is not currently using any nasal sprays. She denies excessive bleeding, constant clear fluid from nose, severe headaches, double vision, or fevers.    Operative Report:  Date of Service: 1/5/2024  Location: [facility] OR    Post-op Diagnoses:  * Chronic maxillary sinusitis [J32.0]  * Polyp of nasal cavity [J33.0]  * Nasal drainage [J34.89]  * Nasal obstruction [J34.89]  * Lesion or mass of paranasal sinuses [J34.89]    Operation/Procedures:  1. Left endoscopic maxillary antrostomy with removal of tissue from sinus  2. Left inferior turbinate outfracture  3. Image guidance navigation - extradural     Operative Findings:  1. Mass lesion in left nasal cavity eminating from left maxillary sinus consistent with nasal sinus polyp and left maxillary sinus with large cyst  2. Absorbable hemostatic material in the left nasal cavity    Surgeon: Jarad Casarez MD FACS  Assistant: None  EBL: 50 ml  Anesthesia: General and local    Per Caitlin Cisneros's initial note 10/10/2023:  Akosua Bo is a 28 y.o. old female. The patient  describes the sinus/nose problems as sudden onset of obstruction in the left nostril. She notes that about 3 days ago, she did have a large amount of bloody mucous dislodge from her nose and has experienced benefit since then. Her facial pressure has resolved since then. Symptoms initially began in August when she thought she had a sinus infection, with colored drainage and nasal obstruction, which improved following 2 courses of Augmentin. Patient denies facial pressure, rhinorrhea, facial pain, periorbital edema, nasal obstruction, throat clearing, hoarseness, loss of taste, and loss of smell. The patient denies epistaxis. Prior history of epistaxis in high school that resolved with cautery on the right. The patient has taken Augmentin medications to alleviate the problem. The medication Augmentin has helped. The patient has not tried nasal saline irrigations. Does not have a history of allergic rhinitis or allergies. They deny a history of aspirin sensitivity. They report 0 sinus infections per year requiring antibiotic treatment. Most recent antibiotic usage includes: Augmentin x 7 days (August), Augmentin x 5 days (in Sept).     No history of prior nasal/sinus surgery or procedure. No prior history of nasal polyps.  Occupation:     Review of Systems   Negative for constitutional, eyes, cardiac, pulmonary, hepatic, renal, digestive, hematologic, epileptic, syncopal, musculoskeletal, mental health, integumentary, hypertensive, lipid, arthritic, diabetic, thyroid or neurologic disorders (except as listed in the HPI, PMH and Problem List).    Assessment   Akosua Bo is a 28 y.o. female h/o chronic maxillary sinusitis, polyp of nasal cavity, nasal obstruction/drainage s/p Left ESS (Max), Left VICENTE. The patient had surgery 1/5/2024. Pathology results returned as chronic inflammation.  1/12/24 - 1st post op. Expected post op swelling today. Debridement performed as noted. Pathology  pending. Continue saline irrigations at least BID. Start Flonase 2 sprays BID.  1/26/24 - 2nd post op. Doing well. Maxillary sinus with some expected crusting, debrided. Continue saline irrigations at least BID. Start Flonase 1 sprays BID.    Plan   I previously reviewed the patient's CT scan images and results. I previously discussed the results personally with the patient. The following findings were discussed: 10/25/23: CT Sinus: Shows complete opacification of the left maxillary sinus. There is a right septal deviation and bilateral inferior turbinate hypertrophy. There appears to be a component of the mass that extends into the nasal cavity. Otherwise, paranasal sinuses are clear.    Nasal Endoscopy with debridement. Findings: as noted.  Reduce/Continue nasal saline irrigations to once or twice daily.  Continue Flonase after saline irrigations, 2 sprays in each nostril twice daily.  Reassurance provided to patient, the nose is healing and edema at this point is expected.  Follow up in 2 months    Referring Provider: Caitlin Cisneros CNP  I will provide a report to the referring provider via the electronic medical record or US mail.    Jarad Casarez MD [facility]  Division of Rhinology, Sinus, and Skull Base Surgery       Exam   General: This is a healthy appearing female who appears her stated age. The patient is alert and appropriately verbally conversant without hoarseness.  Face: The face was inspected and no cutaneous masses or lesions were visualized. There was no erythema or edema noted. Facial movement was symmetric without weakness. No skin lesions were detected.  Eyes: Extra-ocular muscle function was intact. No nystagmus was observed. Pupils were equal.    Nose: Examination of the nose revealed the nasal dorsum to be midline. Intranasal exam reveals the septum is healthy without perforation. The inferior turbinates were expectedly edematous. Crusts and dried secretions noted on anterior rhinoscopy. See  below procedure note as applicable for further exam.    Procedure Note:  Procedure: Nasal endoscopy with debridement - left  Indication: Chronic rhinosinusitis, post operative from sinus surgery  Informed consent obtained: risks, benefits, alternatives, and expectations discussed with patient and the patient wishes to proceed.    Findings: After anesthesia and decongestion with topical lidocaine and Afrin spray, the nasal cavities were examined and debrided with a zero and/or 30-degree endoscope. Small crusts were taken down from the posterior nasal chambers with a straight castellanos or Blakesley forceps. Crusts were debrided and removed from the middle meatus and maxillary cavity on the left. The maxillary sinuses are widely patent. No pus or polyps were noted. There is no CSF leak. Things are healing well. The patient tolerated the procedure well and there were no complications.       Scribe Attestation  By signing my name below, I, Melvin Garces, attest that this documentation has been prepared under the direction and in the presence of Jarad Casarez MD. All medical record entries made by the Scribe were at my direction or personally dictated by me. I have reviewed the chart and agree that the record accurately reflects my personal performance of the history, physical exam, discussion and plan.

## 2024-01-26 ENCOUNTER — OFFICE VISIT (OUTPATIENT)
Dept: OTOLARYNGOLOGY | Facility: CLINIC | Age: 29
End: 2024-01-26
Payer: COMMERCIAL

## 2024-01-26 VITALS — TEMPERATURE: 97.7 F | BODY MASS INDEX: 29.05 KG/M2 | WEIGHT: 180 LBS

## 2024-01-26 DIAGNOSIS — J32.0 CHRONIC MAXILLARY SINUSITIS: Primary | ICD-10-CM

## 2024-01-26 DIAGNOSIS — J34.89 NASAL CRUSTING: ICD-10-CM

## 2024-01-26 PROCEDURE — 31237 NSL/SINS NDSC SURG BX POLYPC: CPT | Performed by: OTOLARYNGOLOGY

## 2024-01-26 PROCEDURE — 99213 OFFICE O/P EST LOW 20 MIN: CPT | Performed by: OTOLARYNGOLOGY

## 2024-01-26 PROCEDURE — 1036F TOBACCO NON-USER: CPT | Performed by: OTOLARYNGOLOGY

## 2024-01-26 ASSESSMENT — PATIENT HEALTH QUESTIONNAIRE - PHQ9
1. LITTLE INTEREST OR PLEASURE IN DOING THINGS: NOT AT ALL
SUM OF ALL RESPONSES TO PHQ9 QUESTIONS 1 AND 2: 0
2. FEELING DOWN, DEPRESSED OR HOPELESS: NOT AT ALL

## 2024-06-13 ENCOUNTER — OFFICE VISIT (OUTPATIENT)
Dept: URGENT CARE | Facility: CLINIC | Age: 29
End: 2024-06-13
Payer: COMMERCIAL

## 2024-06-13 ENCOUNTER — HOSPITAL ENCOUNTER (OUTPATIENT)
Dept: RADIOLOGY | Facility: CLINIC | Age: 29
Discharge: HOME | End: 2024-06-13
Payer: COMMERCIAL

## 2024-06-13 VITALS
SYSTOLIC BLOOD PRESSURE: 131 MMHG | RESPIRATION RATE: 16 BRPM | HEART RATE: 63 BPM | WEIGHT: 180 LBS | DIASTOLIC BLOOD PRESSURE: 85 MMHG | OXYGEN SATURATION: 98 % | TEMPERATURE: 97.5 F | BODY MASS INDEX: 29.05 KG/M2

## 2024-06-13 DIAGNOSIS — S50.01XA CONTUSION OF RIGHT ELBOW, INITIAL ENCOUNTER: ICD-10-CM

## 2024-06-13 DIAGNOSIS — S50.01XA CONTUSION OF RIGHT ELBOW, INITIAL ENCOUNTER: Primary | ICD-10-CM

## 2024-06-13 PROCEDURE — 99203 OFFICE O/P NEW LOW 30 MIN: CPT | Performed by: PHYSICIAN ASSISTANT

## 2024-06-13 PROCEDURE — A6449 LT COMPRES BAND >=3" <5"/YD: HCPCS | Performed by: PHYSICIAN ASSISTANT

## 2024-06-13 PROCEDURE — 1036F TOBACCO NON-USER: CPT | Performed by: PHYSICIAN ASSISTANT

## 2024-06-13 PROCEDURE — 73080 X-RAY EXAM OF ELBOW: CPT | Mod: RT

## 2024-06-13 ASSESSMENT — PAIN SCALES - GENERAL: PAINLEVEL: 4
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
## 2024-06-13 NOTE — PATIENT INSTRUCTIONS
Assessment/Plan   Problem List Items Addressed This Visit       Contusion of right elbow - Primary    Relevant Orders    XR elbow right 1-2 views      Rest, ice the area. Recommend icing area 4 times daily. Do not ice for more than 20 minutes at a time. Wait at least 30min between icings.     Compress the affected area using Ace wrap and elevate to help decrease swelling.     Take ibuprofen as needed for pain and inflammation.    Follow-up with an PCP if symptoms persist despite rest and treatment.

## 2025-08-06 ENCOUNTER — APPOINTMENT (OUTPATIENT)
Facility: CLINIC | Age: 30
End: 2025-08-06
Payer: COMMERCIAL

## 2025-08-07 NOTE — PATIENT INSTRUCTIONS
PATIENT INSTRUCTIONS ARE COMPLETE and READY TO PRINT    Doxycycline:  I prescribed a 10-day course of doxycycline. Take it twice a day after breakfast and supper. In the middle of the day, take a daily probiotic (such as Culturelle) or yogurt.  Take doxycyline with a large glass of water. Stay upright for 30 minutes after taking doxycycline. This is to avoid any irritation of your esophagus if the pill is not completely swallowed.  Doxycycline can cause a skin sensitivity reaction with the sun. Avoid sun exposure while taking this medication or apply strong sunscreen (SPF-50 or higher) when in the sun. Please do not consume any dairy, calcium, or iron for 2 hours before or after taking doxycycline because this inactivates doxycycline. Common side effects from doxycycline include stomach upset and diarrhea. Take the antibiotic after meals to avoid these side effects. If you develop abdominal pain, skin rash, or diarrhea, please contact our office at [phone] or send us a [employer] message.    CT Sinus:  Please get a CT scan of your sinuses at a  facility after you have completed the doxycycline. Please schedule this CT scan today at the  before leaving today. If that is not possible, contact our scheduling and  service at [phone] to schedule the CT scan.    Prednisone taper:  Please start a 12-day tapering course of prednisone. Take as directed. Please take the prednisone each day before noon. If you take the prednisone later in the day, it can cause difficulty sleeping.  The potential long-term side effects of oral steroid use include but are not limited to: difficulty sleeping/insomnia, increased appetite, fluid retention, mood swings, weight gain, change in blood pressure, high blood glucose, possible adrenal suppression, osteoporosis, avascular necrosis of the hip, menstrual irregularities (if applicable), glaucoma, and cataracts. Contact our office if you develop bad hip  pain.    Saline Irrigations:  You may continue saline irrigations.  To prepare a saline irrigation: Always add 1 salt/baking soda packet to 8 ounces of distilled water, or use the saline recipe provided below. Shake to dissolve. Use half of the solution (4 ounces or 120 mL) in each nostril.    Saline recipe:  Use distilled water. Prepare 8 ounces (or 240 mL) of distilled water with 1/2 teaspoon of baking soda and 1/2 teaspoon of table salt. Shake bottle to dissolve. Avoid using tap water. If you must use tap water, be sure to boil it, then let it cool down.    Flonase:  Please restart Flonase (fluticasone). Do 1 spray in each nostril twice daily. If you want to irrigate, please irrigate first before using Flonase. Otherwise, you will wash Flonase out of your nose with the irrigation. Be sure to aim the Flonase spray slightly outwards toward the corner of the eye on the same side nostril.    Follow up:  Please follow up with me in 1 month. If you have questions, feel free to contact my office at [phone] or send us a [employer] message.    Scribe and Provider Attestation  By signing my name below, Sawyer MULLINS Scribe, attest that this documentation has been prepared under the direction and in the presence of Jarad Casarez MD. All medical record entries made by the scribe were at the direction of Jarad Casarez MD or personally dictated by Jarad Casarez MD. I, Jarad Casarez MD, have reviewed the chart and agree that the record accurately reflects my personal performance of the history, physical exam, discussion and plan.

## 2025-08-07 NOTE — PROGRESS NOTES
"          Sinus & Skull Base Surgery    HPI   Akosua Bo is a 29 y.o. old female h/o chronic maxillary sinusitis, polyp of nasal cavity, nasal obstruction/drainage s/p Left ESS (Max), Left VICENTE. The patient had surgery 1/5/2024.  1/12/24 - Patient presents for her 1st post op visit. She reports doing well denies acute concerns. She reports expected post operative congestion and edema. The patient has been doing saline irrigations at least 3 times daily, which have been productive of crusts and dried/old blood. She denies excessive bleeding, constant clear fluid from nose, severe headaches, double vision, or fevers.  1/26/24 - Patient presents for her 2nd post op visit. She reports doing well, except for mild pain when blowing her nose and mild obstruction from the current changing weather conditions. She gets some mild expected occasional discolored yellow drainage. She is performing saline irrigations twice daily. She is not currently using any nasal sprays. She denies excessive bleeding, constant clear fluid from nose, severe headaches, double vision, or fevers.  8/8/25 - Patient presents for a followup visit. She reports that about 1 month ago, she developed L>R nasal obstruction. She increased irrigations from a few times a month to weekly. For the past couple weeks, she had significant left-sided facial pain. She has not taken medications for this. She reports getting a \"stinging\" headache on her right side and is unsure if this is related to her sinuses.    Operative Report:  Date of Service: 1/5/2024  [facility]    Operation/Procedures:  1. Left endoscopic maxillary antrostomy with removal of tissue from sinus  2. Left inferior turbinate outfracture  3. Image guidance navigation - extradural     Operative Findings:  1. Mass lesion in left nasal cavity eminating from left maxillary sinus consistent with nasal sinus polyp and left maxillary sinus with large cyst  2. Absorbable hemostatic " material in the left nasal cavity    Surgeon: Jarad Casarez MD FACS  Assistant: None  EBL: 50 ml  Anesthesia: General and local    Per Caitlin Cisneros's initial note 10/10/2023:  Akosua Bo is a 28 y.o. old female. The patient describes the sinus/nose problems as sudden onset of obstruction in the left nostril. She notes that about 3 days ago, she did have a large amount of bloody mucous dislodge from her nose and has experienced benefit since then. Her facial pressure has resolved since then. Symptoms initially began in August when she thought she had a sinus infection, with colored drainage and nasal obstruction, which improved following 2 courses of Augmentin. Patient denies facial pressure, rhinorrhea, facial pain, periorbital edema, nasal obstruction, throat clearing, hoarseness, loss of taste, and loss of smell. The patient denies epistaxis. Prior history of epistaxis in high school that resolved with cautery on the right. The patient has taken Augmentin medications to alleviate the problem. The medication Augmentin has helped. The patient has not tried nasal saline irrigations. Does not have a history of allergic rhinitis or allergies. They deny a history of aspirin sensitivity. They report 0 sinus infections per year requiring antibiotic treatment. Most recent antibiotic usage includes: Augmentin x 7 days (August), Augmentin x 5 days (in Sept).     No history of prior nasal/sinus surgery or procedure. No prior history of nasal polyps.  Occupation:     Assessment   Akosua Bo is a 29 y.o. female h/o chronic maxillary sinusitis, polyp of nasal cavity, nasal obstruction/drainage s/p Left ESS (Max), Left VICENTE. The patient had surgery 1/5/2024. Pathology results returned as chronic inflammation.  1/12/24 - 1st post op. Expected post op swelling today. Debridement performed as noted. Pathology pending. Continue saline irrigations at least BID. Start Flonase 2 sprays  BID.  1/26/24 - 2nd post op. Doing well. Maxillary sinus with some expected crusting, debrided. Continue saline irrigations at least BID. Start Flonase 1 sprays BID.  8/8/25 - Pt lost to follow up after first 2 POVs. Now reports L>R nasal obstruction over last month. Also reports left-sided facial pain x 2 weeks. Scope exam showing paranasal sinus mass/regrowth and edema/infection eminating from patent maxillary antrostomy into nasal cavity. Rx doxy x 10 days. CT sinus ordered.    Plan   I previously reviewed the patient's CT scan images and results. I previously discussed the results personally with the patient. The following findings were discussed: 10/25/23: CT Sinus: Shows complete opacification of the left maxillary sinus. There is a right septal deviation and bilateral inferior turbinate hypertrophy. There appears to be a component of the mass that extends into the nasal cavity. Otherwise, paranasal sinuses are clear.    Nasal endoscopy. Findings: as noted.  Patient was prescribed a 12-day tapering course of prednisone. The potential long-term side effects of oral steroid use were discussed at length with the patient. These risks include but are not limited to: difficulty sleeping/insomnia, increased appetite, fluid retention, mood swings, weight gain, change in blood pressure, high blood glucose, possible adrenal suppression, osteoporosis, avascular necrosis of the hip, menstrual irregularities (if applicable), glaucoma, and cataracts. The patient understands these risks and is willing to proceed with oral steroid therapy.  Patient was prescribed a 10-day course of doxycycline 100 mg BID. Patient was instructed on the following: Take the antibiotic after meals. Avoid sun exposure or apply strong sunscreen (SPF-50 or higher) when in the sun. Avoid dairy/calcium/iron within 2 hours of taking the antibiotic. Consume a daily yogurt or a probiotic in the middle of the day. Doxycycline should be taken with a large  glass of water. The patient should stay upright for 30 minutes after taking doxycycline to avoid any irritation of the esophagus if the pill is not completely swallowed.  Patient was instructed to obtain a CT scan of the sinuses without contrast after finishing the course of antibiotics.  Continue saline irrigations.  Restart Flonase, 1 sprays in each nostril twice daily.  Reassurance provided to patient, the nose is healing and edema at this point is expected.  Follow up with me in 1 month.    Referring Provider: Caitlin Cisneros CNP  I will provide a report to the referring provider via the electronic medical record or US mail.    Jarad Casarez MD [facility]  Division of Rhinology, Sinus, and Skull Base Surgery       Exam   General: This is a healthy appearing female who appears her stated age. The patient is alert and appropriately verbally conversant without hoarseness.  Face: The face was inspected and no cutaneous masses or lesions were visualized. There was no erythema or edema noted. Facial movement was symmetric without weakness. No skin lesions were detected.  Eyes: Extra-ocular muscle function was intact. No nystagmus was observed. Pupils were equal.    Nose: Examination of the nose revealed the nasal dorsum to be midline. Intranasal exam reveals the septum is healthy without perforation. The inferior turbinates were expectedly edematous. Crusts and dried secretions noted on anterior rhinoscopy. See below procedure note as applicable for further exam.    Procedure Note:  Procedure: Nasal endoscopy DIAGNOSTIC - left  Indication: Chronic rhinosinusitis, post operative from sinus surgery  Informed consent obtained: risks, benefits, alternatives, and expectations discussed with patient and the patient wishes to proceed.    Findings: After anesthesia and decongestion with topical lidocaine and Afrin spray, the nasal cavities were examined and debrided with a zero and/or 30-degree endoscope. Nasal cavity obstructed  with large watery edematous mass lesion and drainage. The maxillary sinus is filled with edematous tissue. There is no CSF leak. Otherwise the inferior, middle, superior turbinates and meatuses, sphenoethmoid recess, nasopharynx, nasal cavity and septum were all normal. The patient tolerated the procedure well and there were no complications.       Scribe and Provider Attestation  By signing my name below, ISawyer Scribe, attest that this documentation has been prepared under the direction and in the presence of Jarad Casarez MD. All medical record entries made by the scribe were at the direction of Jarad Casarez MD or personally dictated by Jarad Casarez MD. I, Jarad Casarez MD, have reviewed the chart and agree that the record accurately reflects my personal performance of the history, physical exam, discussion and plan.

## 2025-08-08 ENCOUNTER — OFFICE VISIT (OUTPATIENT)
Dept: OTOLARYNGOLOGY | Facility: CLINIC | Age: 30
End: 2025-08-08
Payer: COMMERCIAL

## 2025-08-08 VITALS — HEIGHT: 66 IN | WEIGHT: 186.1 LBS | BODY MASS INDEX: 29.91 KG/M2

## 2025-08-08 DIAGNOSIS — J34.89 NASAL OBSTRUCTION: ICD-10-CM

## 2025-08-08 DIAGNOSIS — J34.89 LESION OR MASS OF PARANASAL SINUSES: ICD-10-CM

## 2025-08-08 DIAGNOSIS — J32.0 CHRONIC MAXILLARY SINUSITIS: ICD-10-CM

## 2025-08-08 DIAGNOSIS — J34.89 NASAL DRAINAGE: ICD-10-CM

## 2025-08-08 DIAGNOSIS — J32.0 CHRONIC MAXILLARY SINUSITIS: Primary | ICD-10-CM

## 2025-08-08 PROCEDURE — 99214 OFFICE O/P EST MOD 30 MIN: CPT | Performed by: OTOLARYNGOLOGY

## 2025-08-08 PROCEDURE — 31231 NASAL ENDOSCOPY DX: CPT | Performed by: OTOLARYNGOLOGY

## 2025-08-08 PROCEDURE — 1036F TOBACCO NON-USER: CPT | Performed by: OTOLARYNGOLOGY

## 2025-08-08 PROCEDURE — 3008F BODY MASS INDEX DOCD: CPT | Performed by: OTOLARYNGOLOGY

## 2025-08-08 RX ORDER — PREDNISONE 10 MG/1
TABLET ORAL
Qty: 30 TABLET | Refills: 0 | Status: SHIPPED | OUTPATIENT
Start: 2025-08-08

## 2025-08-08 RX ORDER — DOXYCYCLINE 100 MG/1
100 TABLET ORAL 2 TIMES DAILY
Qty: 20 TABLET | Refills: 0 | Status: SHIPPED | OUTPATIENT
Start: 2025-08-08 | End: 2025-08-18

## 2025-08-08 ASSESSMENT — PATIENT HEALTH QUESTIONNAIRE - PHQ9
SUM OF ALL RESPONSES TO PHQ9 QUESTIONS 1 AND 2: 0
1. LITTLE INTEREST OR PLEASURE IN DOING THINGS: NOT AT ALL
2. FEELING DOWN, DEPRESSED OR HOPELESS: NOT AT ALL

## 2025-08-20 ENCOUNTER — HOSPITAL ENCOUNTER (OUTPATIENT)
Dept: RADIOLOGY | Facility: CLINIC | Age: 30
Discharge: HOME | End: 2025-08-20
Payer: COMMERCIAL

## 2025-08-20 DIAGNOSIS — J32.0 CHRONIC MAXILLARY SINUSITIS: ICD-10-CM

## 2025-08-20 PROCEDURE — 70486 CT MAXILLOFACIAL W/O DYE: CPT | Performed by: RADIOLOGY

## 2025-08-20 PROCEDURE — 70486 CT MAXILLOFACIAL W/O DYE: CPT

## 2025-08-22 ENCOUNTER — OFFICE VISIT (OUTPATIENT)
Facility: CLINIC | Age: 30
End: 2025-08-22
Payer: COMMERCIAL

## 2025-08-22 VITALS
HEART RATE: 72 BPM | SYSTOLIC BLOOD PRESSURE: 144 MMHG | BODY MASS INDEX: 30.13 KG/M2 | HEIGHT: 66 IN | RESPIRATION RATE: 17 BRPM | DIASTOLIC BLOOD PRESSURE: 86 MMHG | WEIGHT: 187.5 LBS | TEMPERATURE: 97.9 F

## 2025-08-22 DIAGNOSIS — M54.16 LUMBAR RADICULOPATHY: Primary | ICD-10-CM

## 2025-08-22 PROCEDURE — 99203 OFFICE O/P NEW LOW 30 MIN: CPT | Performed by: PHYSICIAN ASSISTANT

## 2025-08-22 PROCEDURE — 99213 OFFICE O/P EST LOW 20 MIN: CPT

## 2025-08-22 PROCEDURE — 3008F BODY MASS INDEX DOCD: CPT | Performed by: PHYSICIAN ASSISTANT

## 2025-08-22 PROCEDURE — 1036F TOBACCO NON-USER: CPT | Performed by: PHYSICIAN ASSISTANT

## 2025-08-22 RX ORDER — LIDOCAINE 4 G/100G
PATCH TOPICAL
COMMUNITY
Start: 2025-08-12

## 2025-08-22 RX ORDER — METHOCARBAMOL 500 MG/1
TABLET, FILM COATED ORAL
COMMUNITY
Start: 2025-08-12

## 2025-08-22 ASSESSMENT — PATIENT HEALTH QUESTIONNAIRE - PHQ9
2. FEELING DOWN, DEPRESSED OR HOPELESS: SEVERAL DAYS
1. LITTLE INTEREST OR PLEASURE IN DOING THINGS: SEVERAL DAYS
9. THOUGHTS THAT YOU WOULD BE BETTER OFF DEAD, OR OF HURTING YOURSELF: NOT AT ALL
SUM OF ALL RESPONSES TO PHQ QUESTIONS 1-9: 5
8. MOVING OR SPEAKING SO SLOWLY THAT OTHER PEOPLE COULD HAVE NOTICED. OR THE OPPOSITE, BEING SO FIGETY OR RESTLESS THAT YOU HAVE BEEN MOVING AROUND A LOT MORE THAN USUAL: NOT AT ALL
5. POOR APPETITE OR OVEREATING: NOT AT ALL
3. TROUBLE FALLING OR STAYING ASLEEP OR SLEEPING TOO MUCH: SEVERAL DAYS
7. TROUBLE CONCENTRATING ON THINGS, SUCH AS READING THE NEWSPAPER OR WATCHING TELEVISION: NOT AT ALL
SUM OF ALL RESPONSES TO PHQ9 QUESTIONS 1 AND 2: 2
4. FEELING TIRED OR HAVING LITTLE ENERGY: SEVERAL DAYS
6. FEELING BAD ABOUT YOURSELF - OR THAT YOU ARE A FAILURE OR HAVE LET YOURSELF OR YOUR FAMILY DOWN: SEVERAL DAYS

## 2025-08-22 ASSESSMENT — PAIN SCALES - GENERAL: PAINLEVEL_OUTOF10: 4

## 2025-09-12 ENCOUNTER — APPOINTMENT (OUTPATIENT)
Dept: OTOLARYNGOLOGY | Facility: CLINIC | Age: 30
End: 2025-09-12
Payer: COMMERCIAL

## (undated) DEVICE — TRACKER, INSTRUMENT

## (undated) DEVICE — XEROGEL, NASAL/EPISTAXIS PACK, 8/BOX

## (undated) DEVICE — BLADE, FUSION 4.3 X 13 ROTATABLE

## (undated) DEVICE — Device

## (undated) DEVICE — DRAPE, FLUID WARMER

## (undated) DEVICE — TRAP, SPECIMEN, NEPTUNE QUICK COLLECTOR

## (undated) DEVICE — TOWEL, SURGICAL, NEURO, O/R, 16 X 26, BLUE, STERILE

## (undated) DEVICE — HOLSTER, JET SAFETY

## (undated) DEVICE — TUBE, SALEM SUMP, 16 FR X 48IN, ENFIT

## (undated) DEVICE — TRACKER, STINGRAY, NON-INVASIVE, AXIEM STEALTH NAVIGATION

## (undated) DEVICE — BOWL, BASIN, 32 OZ, STERILE